# Patient Record
Sex: FEMALE | Race: WHITE | NOT HISPANIC OR LATINO | Employment: OTHER | URBAN - METROPOLITAN AREA
[De-identification: names, ages, dates, MRNs, and addresses within clinical notes are randomized per-mention and may not be internally consistent; named-entity substitution may affect disease eponyms.]

---

## 2017-05-03 DIAGNOSIS — R53.83 FATIGUE, UNSPECIFIED TYPE: ICD-10-CM

## 2017-05-03 DIAGNOSIS — E03.9 HYPOTHYROIDISM, UNSPECIFIED TYPE: ICD-10-CM

## 2017-05-03 RX ORDER — THYROID 60 MG
TABLET ORAL
Qty: 90 TABLET | Refills: 0 | Status: SHIPPED | OUTPATIENT
Start: 2017-05-03 | End: 2017-08-01

## 2017-08-01 DIAGNOSIS — R53.83 FATIGUE, UNSPECIFIED TYPE: ICD-10-CM

## 2017-08-01 DIAGNOSIS — E03.9 HYPOTHYROIDISM, UNSPECIFIED TYPE: ICD-10-CM

## 2017-08-02 RX ORDER — THYROID 60 MG
TABLET ORAL
Qty: 30 TABLET | Refills: 0 | Status: SHIPPED | OUTPATIENT
Start: 2017-08-02 | End: 2017-08-04

## 2017-08-02 NOTE — TELEPHONE ENCOUNTER
[place] [facility]     Last Written Prescription Date: 5/3/17  Last Quantity: 90, # refills: 0  Last Office Visit with [facility] or [facility] prescribing provider: 7/12/16        TSH   Date Value Ref Range Status   07/08/2016 0.61 0.40 - 4.00 mU/L Final

## 2017-08-03 DIAGNOSIS — R53.83 FATIGUE, UNSPECIFIED TYPE: ICD-10-CM

## 2017-08-03 DIAGNOSIS — E03.9 HYPOTHYROIDISM, UNSPECIFIED TYPE: ICD-10-CM

## 2017-08-03 RX ORDER — THYROID 60 MG
TABLET ORAL
Qty: 30 TABLET | Refills: 0 | OUTPATIENT
Start: 2017-08-03

## 2017-08-04 RX ORDER — THYROID 60 MG
TABLET ORAL
Qty: 30 TABLET | Refills: 0 | Status: SHIPPED | OUTPATIENT
Start: 2017-08-04 | End: 2017-09-01

## 2017-08-04 NOTE — TELEPHONE ENCOUNTER
Patient is requesting that script be sent to [facility] in [place] NOT [facility].  Script resent.

## 2017-09-01 ENCOUNTER — TELEPHONE (OUTPATIENT)
Dept: INTERNAL MEDICINE | Facility: CLINIC | Age: 57
End: 2017-09-01

## 2017-09-01 DIAGNOSIS — E03.9 HYPOTHYROIDISM, UNSPECIFIED TYPE: ICD-10-CM

## 2017-09-01 DIAGNOSIS — R53.83 FATIGUE, UNSPECIFIED TYPE: ICD-10-CM

## 2017-09-01 RX ORDER — THYROID 60 MG
TABLET ORAL
Qty: 30 TABLET | Refills: 1 | Status: SHIPPED | OUTPATIENT
Start: 2017-09-01 | End: 2017-10-13

## 2017-09-01 NOTE — TELEPHONE ENCOUNTER
TERESE THYROID 60 MG      Last Written Prescription Date: 08/04/17  Last Quantity: 30, # refills: 0  Last Office Visit with [facility] or [facility] prescribing provider: 07/12/16   Next 5 appointments (look out 90 days)     Nov 14, 2017 11:30 AM CST   [facility] with Madd[facility]jas MD   [facility]    [address] [address] [phone]                   TSH   Date Value Ref Range Status   07/08/2016 0.61 0.40 - 4.00 mU/L Final

## 2017-09-01 NOTE — TELEPHONE ENCOUNTER
Patient needs to have an appointment from the one that was rescheduled from a cancellation.  Please give her a call to set that up [phone]

## 2017-09-01 NOTE — TELEPHONE ENCOUNTER
Prescription approved per [facility] Refill Protocol.  [facility]

## 2017-10-13 ENCOUNTER — OFFICE VISIT (OUTPATIENT)
Dept: INTERNAL MEDICINE | Facility: CLINIC | Age: 57
End: 2017-10-13
Payer: COMMERCIAL

## 2017-10-13 VITALS
RESPIRATION RATE: 18 BRPM | TEMPERATURE: 98.4 F | BODY MASS INDEX: 27.5 KG/M2 | OXYGEN SATURATION: 99 % | WEIGHT: 186.2 LBS | HEART RATE: 72 BPM | SYSTOLIC BLOOD PRESSURE: 110 MMHG | DIASTOLIC BLOOD PRESSURE: 70 MMHG

## 2017-10-13 DIAGNOSIS — Z11.59 NEED FOR HEPATITIS C SCREENING TEST: ICD-10-CM

## 2017-10-13 DIAGNOSIS — R79.89 ELEVATED FERRITIN: ICD-10-CM

## 2017-10-13 DIAGNOSIS — R74.8 ELEVATED CK: ICD-10-CM

## 2017-10-13 DIAGNOSIS — R53.83 FATIGUE, UNSPECIFIED TYPE: ICD-10-CM

## 2017-10-13 DIAGNOSIS — E55.9 VITAMIN D DEFICIENCY: Primary | ICD-10-CM

## 2017-10-13 DIAGNOSIS — J20.9 ACUTE BRONCHITIS WITH SYMPTOMS > 10 DAYS: ICD-10-CM

## 2017-10-13 DIAGNOSIS — Z85.3 HX: BREAST CANCER: ICD-10-CM

## 2017-10-13 DIAGNOSIS — E78.5 HYPERLIPIDEMIA WITH TARGET LDL LESS THAN 160: ICD-10-CM

## 2017-10-13 DIAGNOSIS — E03.9 HYPOTHYROIDISM, UNSPECIFIED TYPE: ICD-10-CM

## 2017-10-13 LAB
ERYTHROCYTE [DISTWIDTH] IN BLOOD BY AUTOMATED COUNT: 13.3 % (ref 10–15)
HCT VFR BLD AUTO: 40.9 % (ref 35–47)
HGB BLD-MCNC: 13.3 G/DL (ref 11.7–15.7)
MCH RBC QN AUTO: 30.9 PG (ref 26.5–33)
MCHC RBC AUTO-ENTMCNC: 32.5 G/DL (ref 31.5–36.5)
MCV RBC AUTO: 95 FL (ref 78–100)
PLATELET # BLD AUTO: 284 10E9/L (ref 150–450)
RBC # BLD AUTO: 4.31 10E12/L (ref 3.8–5.2)
WBC # BLD AUTO: 10.4 10E9/L (ref 4–11)

## 2017-10-13 PROCEDURE — 82550 ASSAY OF CK (CPK): CPT | Performed by: INTERNAL MEDICINE

## 2017-10-13 PROCEDURE — 80053 COMPREHEN METABOLIC PANEL: CPT | Performed by: INTERNAL MEDICINE

## 2017-10-13 PROCEDURE — 36415 COLL VENOUS BLD VENIPUNCTURE: CPT | Performed by: INTERNAL MEDICINE

## 2017-10-13 PROCEDURE — 84439 ASSAY OF FREE THYROXINE: CPT | Performed by: INTERNAL MEDICINE

## 2017-10-13 PROCEDURE — 99214 OFFICE O/P EST MOD 30 MIN: CPT | Performed by: INTERNAL MEDICINE

## 2017-10-13 PROCEDURE — 86803 HEPATITIS C AB TEST: CPT | Performed by: INTERNAL MEDICINE

## 2017-10-13 PROCEDURE — 82306 VITAMIN D 25 HYDROXY: CPT | Performed by: INTERNAL MEDICINE

## 2017-10-13 PROCEDURE — 85027 COMPLETE CBC AUTOMATED: CPT | Performed by: INTERNAL MEDICINE

## 2017-10-13 PROCEDURE — 84443 ASSAY THYROID STIM HORMONE: CPT | Performed by: INTERNAL MEDICINE

## 2017-10-13 RX ORDER — THYROID 60 MG
TABLET ORAL
Qty: 90 TABLET | Refills: 3 | Status: SHIPPED | OUTPATIENT
Start: 2017-10-13 | End: 2023-08-17

## 2017-10-13 RX ORDER — AZITHROMYCIN 500 MG/1
500 TABLET, FILM COATED ORAL DAILY
Qty: 3 TABLET | Refills: 0 | Status: SHIPPED | OUTPATIENT
Start: 2017-10-13 | End: 2017-10-13

## 2017-10-13 RX ORDER — AZITHROMYCIN 500 MG/1
500 TABLET, FILM COATED ORAL DAILY
Qty: 3 TABLET | Refills: 0 | Status: SHIPPED | OUTPATIENT
Start: 2017-10-13 | End: 2017-10-27

## 2017-10-13 NOTE — PROGRESS NOTES
SUBJECTIVE:   Brianda Payton is a 57 year old female who presents to clinic today for the following health issues:      Medication Followup of     Taking Medication as prescribed: yes    Side Effects:  None    Medication Helping Symptoms:  yes       Hypothyroidism Follow-up    Since last visit, patient describes the following symptoms: Weight stable, no hair loss, no skin changes, no constipation, no loose stools      RESPIRATORY SYMPTOMS      Duration: 2 weeks    Description  nasal congestion, rhinorrhea, sore throat, facial pain/pressure and cough    Severity: moderate    Accompanying signs and symptoms: None    History (predisposing factors):  none    Precipitating or alleviating factors: None    Therapies tried and outcome:  rest and fluids oral decongestant      She is also here to discuss her medications from a naturopath provider but unfortunately she does not have a medication list with her today.  She reports that she has felt significantly better since she started on the vitamins and supplements as well as the bioidentical hormone medication   Problem list and histories reviewed & adjusted, as indicated.  Additional history: as documented    Labs reviewed in EPIC    Reviewed and updated as needed this visit by clinical staffTobacco  Allergies  Meds       Reviewed and updated as needed this visit by Provider         ROS:  14 point ROS negative except as above      OBJECTIVE:     /70  Pulse 72  Temp 98.4  F (36.9  C) (Oral)  Resp 18  Wt 186 lb 3.2 oz (84.5 kg)  LMP 09/27/1997  SpO2 99%  BMI 27.5 kg/m2  Body mass index is 27.5 kg/(m^2).  GENERAL: healthy, alert and no distress  NECK: no adenopathy, no asymmetry, masses, or scars and thyroid normal to palpation  RESP: lungs clear to auscultation - no rales, rhonchi or wheezes  CV: regular rate and rhythm, normal S1 S2, no S3 or S4, no murmur, click or rub, no peripheral edema and peripheral pulses strong  ABDOMEN: soft, nontender, no  hepatosplenomegaly, no masses and bowel sounds normal  MS: no gross musculoskeletal defects noted, no edema    Diagnostic Test Results:  Results for orders placed or performed in visit on 10/13/17   Vitamin D Deficiency   Result Value Ref Range    Vitamin D Deficiency screening 70 20 - 75 ug/L   Comprehensive metabolic panel   Result Value Ref Range    Sodium 141 133 - 144 mmol/L    Potassium 3.7 3.4 - 5.3 mmol/L    Chloride 105 94 - 109 mmol/L    Carbon Dioxide 29 20 - 32 mmol/L    Anion Gap 7 3 - 14 mmol/L    Glucose 85 70 - 99 mg/dL    Urea Nitrogen 10 7 - 30 mg/dL    Creatinine 0.72 0.52 - 1.04 mg/dL    GFR Estimate 83 >60 mL/min/1.7m2    GFR Estimate If Black >90 >60 mL/min/1.7m2    Calcium 9.0 8.5 - 10.1 mg/dL    Bilirubin Total 0.4 0.2 - 1.3 mg/dL    Albumin 4.0 3.4 - 5.0 g/dL    Protein Total 7.5 6.8 - 8.8 g/dL    Alkaline Phosphatase 75 40 - 150 U/L    ALT 29 0 - 50 U/L    AST 22 0 - 45 U/L   TSH   Result Value Ref Range    TSH 0.49 0.40 - 4.00 mU/L   T4 FREE   Result Value Ref Range    T4 Free 1.07 0.76 - 1.46 ng/dL   CBC with platelets   Result Value Ref Range    WBC 10.4 4.0 - 11.0 10e9/L    RBC Count 4.31 3.8 - 5.2 10e12/L    Hemoglobin 13.3 11.7 - 15.7 g/dL    Hematocrit 40.9 35.0 - 47.0 %    MCV 95 78 - 100 fl    MCH 30.9 26.5 - 33.0 pg    MCHC 32.5 31.5 - 36.5 g/dL    RDW 13.3 10.0 - 15.0 %    Platelet Count 284 150 - 450 10e9/L   CK total   Result Value Ref Range    CK Total 182 30 - 225 U/L   Hepatitis C Screen Reflex to HCV RNA Quant and Genotype   Result Value Ref Range    Hepatitis C Antibody Nonreactive NR^Nonreactive       ASSESSMENT/PLAN:     DIAGNOSIS/PLAN:     ICD-10-CM    1. Vitamin D deficiency E55.9 Vitamin D Deficiency     cholecalciferol (VITAMIN D3) 34793 UNITS capsule     DISCONTINUED: cholecalciferol (VITAMIN D3) 49465 UNITS capsule   2. Elevated CK R74.8 Comprehensive metabolic panel     CK total   3. Hypothyroidism, unspecified type E03.9 TSH     T4 FREE     CBC with platelets      ARMOUR THYROID 60 MG tablet   4. Fatigue, unspecified type R53.83 ARMOUR THYROID 60 MG tablet   5. Acute bronchitis with symptoms > 10 days J20.9 DISCONTINUED: azithromycin (ZITHROMAX) 500 MG tablet     DISCONTINUED: azithromycin (ZITHROMAX) 500 MG tablet   6. Need for hepatitis C screening test Z11.59 Hepatitis C Screen Reflex to HCV RNA Quant and Genotype   7. HX: breast cancer Z85.3 MR Breast Bilateral w Contrast   8. Hyperlipidemia with target LDL less than 160 E78.5 Lipid panel reflex to direct LDL   9. Elevated ferritin R79.89 Ferritin     Iron and iron binding capacity     Transferrin    FROM OUTSIDE       SIGNIFICANT ISSUES RE The primary encounter diagnosis was Vitamin D deficiency. Diagnoses of Elevated CK, Hypothyroidism, unspecified type, Fatigue, unspecified type, Acute bronchitis with symptoms > 10 days, Need for hepatitis C screening test, HX: breast cancer, Hyperlipidemia with target LDL less than 160, and Elevated ferritin were also pertinent to this visit. AS NOTED AND ADDRESSED ABOVE   MEDS AND AND LABS AS ORDERED TO ADDRESS PREVIOUS AND CURRENT ABNORMAL INDICES    SEE PATIENT INSTRUCTION SECTION FOR FOLLOW UP PLAN    Brianda IS TO CONTINUE OTHER TREATMENT REGIMEN/PLANS EXCEPT AS INDICATED    COMPLIANCE WITH MEDICATIONS DIET AND EXERCISE PLANS ENCOURAGED    DISCONTINUED MEDS:  Medications Discontinued During This Encounter   Medication Reason     cholecalciferol (VITAMIN D3) 36938 UNITS capsule Reorder     ARMOUR THYROID 60 MG tablet Reorder     azithromycin (ZITHROMAX) 500 MG tablet Reorder       CURRENT MED LIST WITH CHANGES AS NOTED BELOW:  Current Outpatient Prescriptions   Medication Sig Dispense Refill     cholecalciferol (VITAMIN D3) 28243 UNITS capsule TAKE ON THE 1ST, 10TH AND 20TH, WITH A FAT CONTAINING MEAL 40 capsule PRN     Calcium Lactate 500 MG CAPS THREE TIMES A DAY 30 capsule      ARMOUR THYROID 60 MG tablet TAKE 1 TABLET BY MOUTH DAILY FIRST THING IN THE MORNING ON AN EMPTY  STOMACH, NO FOOD FOR AN HOUR 90 tablet 3     aspirin 81 MG tablet Take 1 tablet (81 mg) by mouth daily INDICATION: FOR HEART AND CARDIOVASCULAR HEALTH 100 tablet 3     Cyanocobalamin (VITAMIN B 12 PO) Take 50 mcg by mouth             Patient Instructions     ** FOLLOW UP PLAN**:    PLEASE SCHEDULE E-VISIT 3-4 DAYS FROM TODAY TO FOLLOW UP ON YOUR TEST RESULTS     TO INITIATE AN e-VISIT PLEASE GO UNDER THE SECURE MESSAGING TAB IN [employer] AND CLICK ON REQUEST e-VISIT TAB      YOU HAVE ALSO BEEN REFERRED FOR MRI of the breasts  PLEASE  MAKE SURE TO SCHEDULE YOUR APPOINTMENT(S) BY TELEPHONE      YOU MAY CONTACT THE CLINIC IF ANY QUESTIONS OR CONCERNS -[phone] OR VIA [employer]      Component      Latest Ref Rng & Units 10/13/2017   Sodium      133 - 144 mmol/L 141   Potassium      3.4 - 5.3 mmol/L 3.7   Chloride      94 - 109 mmol/L 105   Carbon Dioxide      20 - 32 mmol/L 29   Anion Gap      3 - 14 mmol/L 7   Glucose      70 - 99 mg/dL 85   Urea Nitrogen      7 - 30 mg/dL 10   Creatinine      0.52 - 1.04 mg/dL 0.72   GFR Estimate      >60 mL/min/1.7m2 83   GFR Estimate If Black      >60 mL/min/1.7m2 >90   Calcium      8.5 - 10.1 mg/dL 9.0   Bilirubin Total      0.2 - 1.3 mg/dL 0.4   Albumin      3.4 - 5.0 g/dL 4.0   Protein Total      6.8 - 8.8 g/dL 7.5   Alkaline Phosphatase      40 - 150 U/L 75   ALT      0 - 50 U/L 29   AST      0 - 45 U/L 22   WBC      4.0 - 11.0 10e9/L 10.4   RBC Count      3.8 - 5.2 10e12/L 4.31   Hemoglobin      11.7 - 15.7 g/dL 13.3   Hematocrit      35.0 - 47.0 % 40.9   MCV      78 - 100 fl 95   MCH      26.5 - 33.0 pg 30.9   MCHC      31.5 - 36.5 g/dL 32.5   RDW      10.0 - 15.0 % 13.3   Platelet Count      150 - 450 10e9/L 284   Vitamin D Deficiency screening      20 - 75 ug/L 70   TSH      0.40 - 4.00 mU/L 0.49   T4 Free      0.76 - 1.46 ng/dL 1.07   CK Total      30 - 225 U/L 182   Hepatitis C Antibody      NR:Nonreactive Nonreactive           Maddison Rojas MD  [facility]  [facility]

## 2017-10-13 NOTE — MR AVS SNAPSHOT
After Visit Summary   10/13/2017    Brianda Payton    MRN: [id number]           Patient Information     Date Of Birth          1960        Visit Information        Provider Department      10/13/2017 2:00 PM Maddison Rojas MD [facility]        Today's Diagnoses     Vitamin D deficiency    -  1    Elevated CK        Hypothyroidism, unspecified type        Fatigue, unspecified type        Acute bronchitis with symptoms > 10 days        Need for hepatitis C screening test        HX: breast cancer        Hyperlipidemia with target LDL less than 160        Elevated ferritin          Care Instructions    ** FOLLOW UP PLAN**:    PLEASE SCHEDULE E-VISIT 3-4 DAYS FROM TODAY TO FOLLOW UP ON YOUR TEST RESULTS     TO INITIATE AN e-VISIT PLEASE GO UNDER THE SECURE MESSAGING TAB IN [employer] AND CLICK ON REQUEST e-VISIT TAB      YOU HAVE ALSO BEEN REFERRED FOR MRI of the breasts  PLEASE  MAKE SURE TO SCHEDULE YOUR APPOINTMENT(S) BY TELEPHONE      YOU MAY CONTACT THE CLINIC IF ANY QUESTIONS OR CONCERNS -[phone] OR VIA [employer]      Component      Latest Ref Rng & Units 10/13/2017   Sodium      133 - 144 mmol/L 141   Potassium      3.4 - 5.3 mmol/L 3.7   Chloride      94 - 109 mmol/L 105   Carbon Dioxide      20 - 32 mmol/L 29   Anion Gap      3 - 14 mmol/L 7   Glucose      70 - 99 mg/dL 85   Urea Nitrogen      7 - 30 mg/dL 10   Creatinine      0.52 - 1.04 mg/dL 0.72   GFR Estimate      >60 mL/min/1.7m2 83   GFR Estimate If Black      >60 mL/min/1.7m2 >90   Calcium      8.5 - 10.1 mg/dL 9.0   Bilirubin Total      0.2 - 1.3 mg/dL 0.4   Albumin      3.4 - 5.0 g/dL 4.0   Protein Total      6.8 - 8.8 g/dL 7.5   Alkaline Phosphatase      40 - 150 U/L 75   ALT      0 - 50 U/L 29   AST      0 - 45 U/L 22   WBC      4.0 - 11.0 10e9/L 10.4   RBC Count      3.8 - 5.2 10e12/L 4.31   Hemoglobin      11.7 - 15.7 g/dL 13.3   Hematocrit      35.0 - 47.0 % 40.9   MCV      78 - 100 fl 95   MCH     "  26.5 - 33.0 pg 30.9   MCHC      31.5 - 36.5 g/dL 32.5   RDW      10.0 - 15.0 % 13.3   Platelet Count      150 - 450 10e9/L 284   Vitamin D Deficiency screening      20 - 75 ug/L 70   TSH      0.40 - 4.00 mU/L 0.49   T4 Free      0.76 - 1.46 ng/dL 1.07   CK Total      30 - 225 U/L 182   Hepatitis C Antibody      NR:Nonreactive Nonreactive               Follow-ups after your visit        Your next 10 appointments already scheduled     Oct 27, 2017 10:30 AM CDT   (Arrive by 10:15 AM)   MA DIAGNOSTIC DIGITAL BILATERAL with [id number]   [facility] ([facility] [place])    [address]  [address] [facility] [address] [phone]           Do not use any powder, lotion or deodorant under your arms or on your breast. If you do, we will ask you to remove it before your exam.  Wear comfortable, two-piece clothing.  If you have any allergies, tell your care team.  Bring any previous mammograms from other facilities or have them mailed to the breast center.  Three-dimensional (3D) mammograms are available at [place] locations in [facility], [facility], and [place]. [facility] locations include [place] [facility] in [place]. Benefits of 3D mammograms include: - Improved rate of cancer detection - Decreases your chance of having to go back for more tests, which means fewer: - \"False-positive\" results (This means that there is an abnormal area but it isn't cancer.) - Invasive testing procedures, such as a biopsy or surgery - Can provide clearer images of the breast if you have dense breast tissue. 3D mammography is an optional exam that anyone can have with a 2D mammogram. It doesn't replace or take the place of a 2D mammogram. 2D mammograms remain an effective screening test for all women.  Not all insurance companies cover the cost of a 3D mammogram. Check with your insurance.            Oct 27, 2017 " 11:00 AM CDT   (Arrive by 10:45 AM)   Return Visit with Alysa Padron MD   [facility] ([facility])    [address]  [address] [facility] [address] [phone]              Who to contact     If you have questions or need follow up information about today's clinic visit or your schedule please contact [facility] directly at [phone].  Normal or non-critical lab and imaging results will be communicated to you by [email or web address], letter or phone within 4 business days after the clinic has received the results. If you do not hear from us within 7 days, please contact the clinic through [email or web address] or phone. If you have a critical or abnormal lab result, we will notify you by phone as soon as possible.  Submit refill requests through [employer] or call your pharmacy and they will forward the refill request to us. Please allow 3 business days for your refill to be completed.          Additional Information About Your Visit        [email or web address] Information     [employer] gives you secure access to your electronic health record. If you see a primary care provider, you can also send messages to your care team and make appointments. If you have questions, please call your primary care clinic.  If you do not have a primary care provider, please call [phone] and they will assist you.        Care EveryWhere ID     This is your Care EveryWhere ID. This could be used by other organizations to access your [place] medical records  [id number]        Your Vitals Were     Pulse Temperature Respirations Last Period Pulse Oximetry BMI (Body Mass Index)    72 98.4  F (36.9  C) (Oral) 18 09/27/1997 99% 27.5 kg/m2       Blood Pressure from Last 3 Encounters:   10/13/17 110/70   12/27/16 123/52   12/23/16 114/71    Weight from Last 3 Encounters:   10/13/17 186 lb 3.2 oz (84.5 kg)   12/27/16 186 lb (84.4 kg)   12/23/16 186 lb 12.8 oz (84.7 kg)              We  Performed the Following     CBC with platelets     CK total     Comprehensive metabolic panel     Hepatitis C Screen Reflex to HCV RNA Quant and Genotype     T4 FREE     TSH     Vitamin D Deficiency          Today's Medication Changes          These changes are accurate as of: 10/13/17 11:59 PM.  If you have any questions, ask your nurse or doctor.               Start taking these medicines.        Dose/Directions    azithromycin 500 MG tablet   Commonly known as:  ZITHROMAX   Used for:  Acute bronchitis with symptoms > 10 days   Started by:  Maddison Rojas MD        Dose:  500 mg   Take 1 tablet (500 mg) by mouth daily INDICATION: TO TREAT BRONCHITIS   Quantity:  3 tablet   Refills:  0       cholecalciferol 51832 UNITS capsule   Commonly known as:  VITAMIN D3   Used for:  Vitamin D deficiency   Started by:  Maddison Rojas MD        TAKE ON THE 1ST, 10TH AND 20TH, WITH A FAT CONTAINING MEAL   Quantity:  40 capsule   Refills:  PRN            Where to get your medicines      These medications were sent to [place] [id number]     Phone:  [phone]     azithromycin 500 MG tablet    cholecalciferol 88508 UNITS capsule         These medications were sent to [facility] [address] [place] [address] AT [place] [id number]    Hours:  24-hours Phone:  [phone]     ARMOUR THYROID 60 MG tablet                Primary Care Provider Office Phone # Fax #    Maddison Rojas [phone]       [address] [place] [address]        Equal Access to Services     MITCHELL GUZMAN : Hadii oscar martinezo Soelbert, waaxda luqadaha, qaybta kaalmada blakeyaeliezer, hebert jones. So [facility] [phone].    ATENCIÓN: Si habla español, tiene a wilde disposición servicios gratuitos de asistencia lingüística. Llame al [phone].    We comply with  applicable federal civil rights laws and [place] laws. We do not discriminate on the basis of race, color, national origin, age, disability, sex, sexual orientation, or gender identity.            Thank you!     Thank you for choosing [facility]  for your care. Our goal is always to provide you with excellent care. Hearing back from our patients is one way we can continue to improve our services. Please take a few minutes to complete the written survey that you may receive in the mail after your visit with us. Thank you!             Your Updated Medication List - Protect others around you: Learn how to safely use, store and throw away your medicines at www.disposemymeds.org.          This list is accurate as of: 10/13/17 11:59 PM.  Always use your most recent med list.                   Brand Name Dispense Instructions for use Diagnosis    ARMOUR THYROID 60 MG tablet   Generic drug:  thyroid     90 tablet    TAKE 1 TABLET BY MOUTH DAILY FIRST THING IN THE MORNING ON AN EMPTY STOMACH, NO FOOD FOR AN HOUR    Fatigue, unspecified type, Hypothyroidism, unspecified type       aspirin 81 MG tablet     100 tablet    Take 1 tablet (81 mg) by mouth daily INDICATION: FOR HEART AND CARDIOVASCULAR HEALTH    Hyperlipidemia with target LDL less than 160       azithromycin 500 MG tablet    ZITHROMAX    3 tablet    Take 1 tablet (500 mg) by mouth daily INDICATION: TO TREAT BRONCHITIS    Acute bronchitis with symptoms > 10 days       Calcium Lactate 500 MG Caps     30 capsule    THREE TIMES A DAY        cholecalciferol 52303 UNITS capsule    VITAMIN D3    40 capsule    TAKE ON THE 1ST, 10TH AND 20TH, WITH A FAT CONTAINING MEAL    Vitamin D deficiency

## 2017-10-13 NOTE — PATIENT INSTRUCTIONS
** FOLLOW UP PLAN**:    PLEASE SCHEDULE E-VISIT 3-4 DAYS FROM TODAY TO FOLLOW UP ON YOUR TEST RESULTS     TO INITIATE AN e-VISIT PLEASE GO UNDER THE SECURE MESSAGING TAB IN [employer] AND CLICK ON REQUEST e-VISIT TAB      YOU HAVE ALSO BEEN REFERRED FOR MRI of the breasts  PLEASE  MAKE SURE TO SCHEDULE YOUR APPOINTMENT(S) BY TELEPHONE      YOU MAY CONTACT THE CLINIC IF ANY QUESTIONS OR CONCERNS -[phone] OR VIA [employer]      Component      Latest Ref Rng & Units 10/13/2017   Sodium      133 - 144 mmol/L 141   Potassium      3.4 - 5.3 mmol/L 3.7   Chloride      94 - 109 mmol/L 105   Carbon Dioxide      20 - 32 mmol/L 29   Anion Gap      3 - 14 mmol/L 7   Glucose      70 - 99 mg/dL 85   Urea Nitrogen      7 - 30 mg/dL 10   Creatinine      0.52 - 1.04 mg/dL 0.72   GFR Estimate      >60 mL/min/1.7m2 83   GFR Estimate If Black      >60 mL/min/1.7m2 >90   Calcium      8.5 - 10.1 mg/dL 9.0   Bilirubin Total      0.2 - 1.3 mg/dL 0.4   Albumin      3.4 - 5.0 g/dL 4.0   Protein Total      6.8 - 8.8 g/dL 7.5   Alkaline Phosphatase      40 - 150 U/L 75   ALT      0 - 50 U/L 29   AST      0 - 45 U/L 22   WBC      4.0 - 11.0 10e9/L 10.4   RBC Count      3.8 - 5.2 10e12/L 4.31   Hemoglobin      11.7 - 15.7 g/dL 13.3   Hematocrit      35.0 - 47.0 % 40.9   MCV      78 - 100 fl 95   MCH      26.5 - 33.0 pg 30.9   MCHC      31.5 - 36.5 g/dL 32.5   RDW      10.0 - 15.0 % 13.3   Platelet Count      150 - 450 10e9/L 284   Vitamin D Deficiency screening      20 - 75 ug/L 70   TSH      0.40 - 4.00 mU/L 0.49   T4 Free      0.76 - 1.46 ng/dL 1.07   CK Total      30 - 225 U/L 182   Hepatitis C Antibody      NR:Nonreactive Nonreactive

## 2017-10-13 NOTE — NURSING NOTE
"  Chief Complaint   Patient presents with     Recheck Medication     Thyroid Problem       Initial /70  Pulse 72  Temp 98.4  F (36.9  C) (Oral)  Resp 18  Wt 186 lb 3.2 oz (84.5 kg)  LMP 09/27/1997  SpO2 99%  BMI 27.5 kg/m2 Estimated body mass index is 27.5 kg/(m^2) as calculated from the following:    Height as of 12/27/16: 5' 9\" (1.753 m).    Weight as of this encounter: 186 lb 3.2 oz (84.5 kg).  Blood pressure completed using cuff size: regular    "

## 2017-10-14 LAB
ALBUMIN SERPL-MCNC: 4 G/DL (ref 3.4–5)
ALP SERPL-CCNC: 75 U/L (ref 40–150)
ALT SERPL W P-5'-P-CCNC: 29 U/L (ref 0–50)
ANION GAP SERPL CALCULATED.3IONS-SCNC: 7 MMOL/L (ref 3–14)
AST SERPL W P-5'-P-CCNC: 22 U/L (ref 0–45)
BILIRUB SERPL-MCNC: 0.4 MG/DL (ref 0.2–1.3)
BUN SERPL-MCNC: 10 MG/DL (ref 7–30)
CALCIUM SERPL-MCNC: 9 MG/DL (ref 8.5–10.1)
CHLORIDE SERPL-SCNC: 105 MMOL/L (ref 94–109)
CK SERPL-CCNC: 182 U/L (ref 30–225)
CO2 SERPL-SCNC: 29 MMOL/L (ref 20–32)
CREAT SERPL-MCNC: 0.72 MG/DL (ref 0.52–1.04)
GFR SERPL CREATININE-BSD FRML MDRD: 83 ML/MIN/1.7M2
GLUCOSE SERPL-MCNC: 85 MG/DL (ref 70–99)
POTASSIUM SERPL-SCNC: 3.7 MMOL/L (ref 3.4–5.3)
PROT SERPL-MCNC: 7.5 G/DL (ref 6.8–8.8)
SODIUM SERPL-SCNC: 141 MMOL/L (ref 133–144)
T4 FREE SERPL-MCNC: 1.07 NG/DL (ref 0.76–1.46)
TSH SERPL DL<=0.005 MIU/L-ACNC: 0.49 MU/L (ref 0.4–4)

## 2017-10-15 LAB
DEPRECATED CALCIDIOL+CALCIFEROL SERPL-MC: 70 UG/L (ref 20–75)
HCV AB SERPL QL IA: NONREACTIVE

## 2017-10-17 NOTE — PROGRESS NOTES
Dear Brianda,   Your recent test results were reviewed and are within acceptable limits. Please continue with other treatment, and follow up plans as discussed and do not hesitate to contact me with any questions or concerns.  Stay healthy!    Regards,  Dr. Bob Baez

## 2017-10-27 ENCOUNTER — ONCOLOGY VISIT (OUTPATIENT)
Dept: ONCOLOGY | Facility: CLINIC | Age: 57
End: 2017-10-27
Attending: INTERNAL MEDICINE
Payer: COMMERCIAL

## 2017-10-27 VITALS
WEIGHT: 184.4 LBS | HEART RATE: 75 BPM | DIASTOLIC BLOOD PRESSURE: 73 MMHG | SYSTOLIC BLOOD PRESSURE: 129 MMHG | HEIGHT: 69 IN | BODY MASS INDEX: 27.31 KG/M2 | TEMPERATURE: 98 F | OXYGEN SATURATION: 96 %

## 2017-10-27 DIAGNOSIS — N60.99 ATYPICAL DUCTAL HYPERPLASIA OF BREAST: Primary | ICD-10-CM

## 2017-10-27 DIAGNOSIS — Z84.89 FAMILY HISTORY OF BRAIN TUMOR: ICD-10-CM

## 2017-10-27 PROCEDURE — 99213 OFFICE O/P EST LOW 20 MIN: CPT | Mod: ZP | Performed by: INTERNAL MEDICINE

## 2017-10-27 PROCEDURE — 99212 OFFICE O/P EST SF 10 MIN: CPT | Mod: ZF

## 2017-10-27 ASSESSMENT — PAIN SCALES - GENERAL: PAINLEVEL: NO PAIN (0)

## 2017-10-27 NOTE — LETTER
10/27/2017        RE: Brianda Payton  [address]  [facility] [address]     Dear Colleague,    Thank you for referring your patient, Brianda Payton, to the [facility]. Please see a copy of my visit note below.    October 27, 2017    REASON FOR VISIT:  I am seeing Ms. Brianda Payton in followup for atypical ductal hyperplasia of her breast.       HISTORY OF PRESENT ILLNESS:  Brianda is a 56-year-old female, otherwise healthy, who comes in today for follow up.     Brianda reports that she has been healthy and well.  She has a strong family history of melanoma as well as brain tumors as will be outlined below.  She reports that in 2005 she underwent bilateral breast reduction.  In 2006 she was found to have an abnormal mammogram.  Biopsy revealed atypical ductal hyperplasia.  I do not have copies of all of these reports.  She was originally told to undergo bilateral mastectomies.  She declined this and sought out further consultation.  She eventually met Dr. Latosha Leger.  She was recommended to undergo high-risk screening with breast MRI and mammogram.  She alternates these every 6 months.  She has had two additional negative breast biopsies at [facility] in the last couple of years.  These were both benign.       It was discussed with her whether to consider tamoxifen therapy as a preventative.  She is not interested in this.       Brianda returns today for followup.  Overall, she says that she is feeling well.  She has no issues with fevers or chills, chest pain or shortness of breath.  She has no nausea, vomiting, diarrhea or constipation.  She last had a menstrual period in 1997.  She has had no new breast nodules or masses.  She did have a screening mammogram today.      She has been seeing a functional medicine physician.  She has been trying to lose weight.  She is frustrated that she has not been able to lose weight and has questions about this.       Her sister was just recently  diagnosed with a glioblastoma and is being treated at [facility].  She has a very strong family history of breast cancer as well as brain tumors.  She has never seen a genetic counselor.  She is unsure whether anybody in her family has.           REVIEW OF SYSTEMS:  Her 10-point review of systems is otherwise negative.      PAST MEDICAL HISTORY:   1.  Atypical ductal hyperplasia of the breast.   2.  Hot flashes.   3.  Benign thyroid nodules that have been aspirated.   4.  Hypothyroidism.   5.  Obstructive sleep apnea.   6.  Allergic rhinitis.      MEDICATIONS:  Reviewed in the EMR.       ALLERGIES:  She has seasonal allergies.  No drug allergies.      SOCIAL HISTORY:  She had two grandbabies who are ages 1 and 3.  She and her  spend most of [place] in [place]  [place] to [place] times a [place].  [place] consider this their home base.  She has no tobacco use and rare alcohol use.       FAMILY HISTORY:  Her family history is significant for a paternal grandmother with breast cancer in her 70s, and a paternal aunt and a paternal cousin with brain tumors.  There is a lot of melanoma.  She has a sister with melanoma as well as a sister with a brain tumor.  She has two daughters, ages 31 and 35, who are both healthy without any evidence of cancer.       PAST GYNECOLOGIC HISTORY:  She is postmenopausal.  Her last menstrual period was in 1997.  She has had breast biopsies as outlined above.  She has two children, ages 31 and 35.  She was on hormone replacement therapy for short periods of time in the past.  Her one daughter has had rheumatic fever and heart valve surgery.  Of note, she has had a hysterectomy in her 30s due to fibroids.       PHYSICAL EXAMINATION: LMP 09/27/1997    GENERAL:  She is well-appearing, in no apparent distress.   HEENT:  Exam reveals no icterus.  Her oropharynx is clear.  There are no ulcers or lesions.    NECK:  Supple.     LYMPH:  There is no cervical, supraclavicular  "or axillary adenopathy.   HEART:  Regular.   LUNGS:  Clear bilaterally.   ABDOMEN:  Soft, nontender, nondistended.  There is no palpable hepatosplenomegaly.   BREASTS:  She is status post bilateral breast reduction.  She has a scar at the 9 o'clock position involving the upper outer quadrant of her right breast.  There are no nodules or masses in either breast.  There is no axillary adenopathy, no nipple discharge, and no changes in the skin.   NEUROLOGIC:  Exam is unremarkable.    SKIN:  There are no skin rashes.       LABORATORY:  No laboratories.       ASSESSMENT AND PLAN:  This is a 57-year-old female with a history of atypical ductal hyperplasia involving the right breast, status post lumpectomy, currently on observation for high-risk disease involving breast MRI and mammogram.       1.  Atypical ductal hyperplasia.  She has no evidence of breast cancer today.  Her last mammogram was today. We discussed that she would be due for a breast MRI in April.  She is alternating these every 6 months.       We spent time discussing chemo prevention using tamoxifen or an aromatase inhibitor.  We discussed the pros and cons of this.  She is not interested.       We discussed weight management.  She has been seeing a functional medicine physician and was told her estrogen levels are \"high.\"  We discussed exercise of at least 150 minutes per week as well as a diet high in fruits and vegetables for evaluation and continued overall health goals.       With her strong family history of melanoma and brain tumors, I recommended a referral to Genetics.  This referral was placed.        2.  She has a benign thyroid.       3.  She sees her primary care physician for other health care prevention.      I will see her back annually with her mammogram.  She is due for a breast MRI in 6 months and this order was placed in the chart.         DAYDAY DEMPSEY MD              "

## 2017-10-27 NOTE — PROGRESS NOTES
October 27, 2017    REASON FOR VISIT:  I am seeing Ms. Brianda Payton in followup for atypical ductal hyperplasia of her breast.       HISTORY OF PRESENT ILLNESS:  Brianda is a 56-year-old female, otherwise healthy, who comes in today for follow up.     Brianda reports that she has been healthy and well.  She has a strong family history of melanoma as well as brain tumors as will be outlined below.  She reports that in 2005 she underwent bilateral breast reduction.  In 2006 she was found to have an abnormal mammogram.  Biopsy revealed atypical ductal hyperplasia.  I do not have copies of all of these reports.  She was originally told to undergo bilateral mastectomies.  She declined this and sought out further consultation.  She eventually met Dr. Latosha Leger.  She was recommended to undergo high-risk screening with breast MRI and mammogram.  She alternates these every 6 months.  She has had two additional negative breast biopsies at [facility] in the last couple of years.  These were both benign.       It was discussed with her whether to consider tamoxifen therapy as a preventative.  She is not interested in this.       Brianda returns today for followup.  Overall, she says that she is feeling well.  She has no issues with fevers or chills, chest pain or shortness of breath.  She has no nausea, vomiting, diarrhea or constipation.  She last had a menstrual period in 1997.  She has had no new breast nodules or masses.  She did have a screening mammogram today.      She has been seeing a functional medicine physician.  She has been trying to lose weight.  She is frustrated that she has not been able to lose weight and has questions about this.       Her sister was just recently diagnosed with a glioblastoma and is being treated at [facility].  She has a very strong family history of breast cancer as well as brain tumors.  She has never seen a genetic counselor.  She is unsure whether anybody in her family has.            REVIEW OF SYSTEMS:  Her 10-point review of systems is otherwise negative.      PAST MEDICAL HISTORY:   1.  Atypical ductal hyperplasia of the breast.   2.  Hot flashes.   3.  Benign thyroid nodules that have been aspirated.   4.  Hypothyroidism.   5.  Obstructive sleep apnea.   6.  Allergic rhinitis.      MEDICATIONS:  Reviewed in the EMR.       ALLERGIES:  She has seasonal allergies.  No drug allergies.      SOCIAL HISTORY:  She had two grandbabies who are ages 1 and 3.  She and her  spend most of [place]  [place] times [place].  [place] their home [place].  She has no tobacco use and rare alcohol use.       FAMILY HISTORY:  Her family history is significant for a paternal grandmother with breast cancer in her 70s, and a paternal aunt and a paternal cousin with brain tumors.  There is a lot of melanoma.  She has a sister with melanoma as well as a sister with a brain tumor.  She has two daughters, ages 31 and 35, who are both healthy without any evidence of cancer.       PAST GYNECOLOGIC HISTORY:  She is postmenopausal.  Her last menstrual period was in 1997.  She has had breast biopsies as outlined above.  She has two children, ages 31 and 35.  She was on hormone replacement therapy for short periods of time in the past.  Her one daughter has had rheumatic fever and heart valve surgery.  Of note, she has had a hysterectomy in her 30s due to fibroids.       PHYSICAL EXAMINATION: LMP 09/27/1997    GENERAL:  She is well-appearing, in no apparent distress.   HEENT:  Exam reveals no icterus.  Her oropharynx is clear.  There are no ulcers or lesions.    NECK:  Supple.     LYMPH:  There is no cervical, supraclavicular or axillary adenopathy.   HEART:  Regular.   LUNGS:  Clear bilaterally.   ABDOMEN:  Soft, nontender, nondistended.  There is no palpable hepatosplenomegaly.   BREASTS:  She is status post bilateral breast reduction.  She has a scar at the 9  "o'clock position involving the upper outer quadrant of her right breast.  There are no nodules or masses in either breast.  There is no axillary adenopathy, no nipple discharge, and no changes in the skin.   NEUROLOGIC:  Exam is unremarkable.    SKIN:  There are no skin rashes.       LABORATORY:  No laboratories.       ASSESSMENT AND PLAN:  This is a 57-year-old female with a history of atypical ductal hyperplasia involving the right breast, status post lumpectomy, currently on observation for high-risk disease involving breast MRI and mammogram.       1.  Atypical ductal hyperplasia.  She has no evidence of breast cancer today.  Her last mammogram was today. We discussed that she would be due for a breast MRI in April.  She is alternating these every 6 months.       We spent time discussing chemo prevention using tamoxifen or an aromatase inhibitor.  We discussed the pros and cons of this.  She is not interested.       We discussed weight management.  She has been seeing a functional medicine physician and was told her estrogen levels are \"high.\"  We discussed exercise of at least 150 minutes per week as well as a diet high in fruits and vegetables for evaluation and continued overall health goals.       With her strong family history of melanoma and brain tumors, I recommended a referral to Genetics.  This referral was placed.        2.  She has a benign thyroid.       3.  She sees her primary care physician for other health care prevention.      I will see her back annually with her mammogram.  She is due for a breast MRI in 6 months and this order was placed in the chart.         DAYDAY DEMPSEY MD        "

## 2017-10-27 NOTE — MR AVS SNAPSHOT
After Visit Summary   10/27/2017    Brianda Payton    MRN: [id number]           Patient Information     Date Of Birth          1960        Visit Information        Provider Department      10/27/2017 11:00 AM Alysa Padron MD [facility]        Today's Diagnoses     Atypical ductal hyperplasia of breast    -  1    Family history of brain tumor           Follow-ups after your visit        Additional Services     CANCER RISK MGMT/CANCER GENETIC COUNSELING REFERRAL       Your provider has referred you to the Cancer Risk Management Program - Cancer Genetic Counseling.    Reason for Referral: multiple family members with brain tumors, GBM, personal history of ADH    We have a sent a notice to a staff member of the Cancer Risk Management Program to give you a call to assist with scheduling your appointment.  You may also call  [phone] ([phone]) to initiate scheduling.    Please be aware that coverage of these services is subject to the terms and limitations of your health insurance plan.  Call member services at your health plan with any benefit or coverage questions.      Please bring the completed family history sheet to your appointment in addition to any available outside medical records documenting your cancer diagnosis.                  Your next 10 appointments already scheduled     Feb 14, 2018 10:45 AM CST   (Arrive by 10:30 AM)   MR BREAST BILATERAL W/O & W CONTRAST with [address] [facility] ([facility])    [address] [place] [address] [phone]           Take your medicines as usual, unless your doctor tells you not to. Bring a list of your current medicines to your exam (including vitamins, minerals and over-the-counter drugs).  The timing of your exam may depend on the start of your last period. If you re in menopause, you may have the exam anytime.  Please bring any  previous mammograms or breast MRIs from other facilities to the MRI dept. Do not mail these items to us.  You will have contrast for this exam. To prepare:   The day before your exam, drink extra fluids at least six 8-ounce glasses (unless your doctor tells you to restrict your fluids).   Have a blood test (creatinine test) within 30 days of your exam. Go to your clinic or Diagnostic Imaging Department for this test.  The MRI machine uses a strong magnet. Please wear clothes without metal (snaps, zippers). A sweatsuit works well, or we may give you a hospital gown.  Please remove any body piercings and hair extensions before you arrive. You will also remove watches, jewelry, hairpins, wallets, dentures, partial dental plates and hearing aids. You may wear contact lenses, and you may be able to wear your rings. We have a safe place to keep your personal items, but it is safer to leave them at home.   **IMPORTANT** THE INSTRUCTIONS BELOW ARE ONLY FOR THOSE PATIENTS WHO HAVE BEEN TOLD THEY WILL RECEIVE SEDATION OR GENERAL ANESTHESIA DURING THEIR MRI PROCEDURE:  IF YOU WILL RECEIVE SEDATION (take medicine to help you relax during your exam)   You must get the medicine from your doctor before you arrive. Bring the medicine to the exam. Do not take it at home.   Arrive one hour early. Bring someone who can take you home after the test. Your medicine will make you sleepy. After the exam, you may not drive, take a bus or take a taxi by yourself.   No eating 8 hours before your exam. You may have clear liquids up until 4 hours before your exam. (Clear liquids include water, clear tea, black coffee and fruit juice without pulp.)  IF YOU WILL RECEIVE ANESTHESIA (be asleep for your exam)   Arrive 1 1/2 hours early. Bring someone who can take you home after the test. You may not drive, take a bus or take a taxi by yourself.   No eating 8 hours before your exam. You may have clear liquids up until 4 hours before your exam. (Clear  "liquids include water, clear tea, black coffee and fruit juice without pulp.)  If you have any questions, please contact your Imaging Department exam site.            Feb 14, 2018 12:00 PM CST   (Arrive by 11:45 AM)   KALLIE WITH ROOM with Ria Barry GC,  2 114 CONSULT [facility] ([facility])    [address] [place] [address]  2nd Floor  [facility] [address] [phone]            Jul 17, 2018 10:30 AM CDT   (Arrive by 10:15 AM)   MA DIAGNOSTIC BILATERAL W/ YA with [id number]   [facility] [place] [facility] ([facility])    [address] [facility]  2nd [facility] [address] [phone]           Do not use any powder, lotion or deodorant under your arms or on your breast. If you do, we will ask you to remove it before your exam.  Wear comfortable, two-piece clothing.  If you have any allergies, tell your care team.  Bring any previous mammograms from other facilities or have them mailed to the breast center.  Three-dimensional (3D) mammograms are available at [place] locations in [facility], [facility], [place], [place], and [place]. [facility] locations include [place] [facility] in [place]. Benefits of 3D mammograms include: - Improved rate of cancer detection - Decreases your chance of having to go back for more tests, which means fewer: - \"False-positive\" results (This means that there is an abnormal area but it isn't cancer.) - Invasive testing procedures, such as a biopsy or surgery - Can provide clearer images of the breast if you have dense breast tissue. 3D mammography is an optional exam that anyone can have with a 2D mammogram. It doesn't replace or take the place of a 2D mammogram. 2D mammograms remain an effective screening test for all women.  Not all insurance companies cover the cost of a 3D mammogram. Check with your insurance.              Future tests that " were ordered for you today     Open Standing Orders        Priority Remaining Interval Expires Ordered    MA Diagnostic Digital Bilateral Routine 6/6  10/28/2018 10/27/2017    MR Breast Bilateral w/o & w Contrast Repeat 3/3  10/28/2018 10/27/2017    MA Screening Digital Bilateral Routine 4/6  12/24/2017 12/23/2016          Open Future Orders        Priority Expected Expires Ordered    MR Breast Bilateral w/o & w Contrast Routine  10/27/2018 10/27/2017    MR Breast Bilateral w/o & w Contrast Routine  10/27/2018 10/27/2017    MR Breast Bilateral w/o & w Contrast Routine  10/27/2018 10/27/2017    MA Diagnostic Bilateral w/Geoffrey Routine  10/27/2018 10/27/2017            Who to contact     If you have questions or need follow up information about today's clinic visit or your schedule please contact [facility] directly at [phone].  Normal or non-critical lab and imaging results will be communicated to you by MyChart, letter or phone within 4 business days after the clinic has received the results. If you do not hear from us within 7 days, please contact the clinic through [employer] or phone. If you have a critical or abnormal lab result, we will notify you by phone as soon as possible.  Submit refill requests through [employer] or call your pharmacy and they will forward the refill request to us. Please allow 3 business days for your refill to be completed.          Additional Information About Your Visit        [employer] NEXT Information     [employer] gives you secure access to your electronic health record. If you see a primary care provider, you can also send messages to your care team and make appointments. If you have questions, please call your primary care clinic.  If you do not have a primary care provider, please call [phone] and they will assist you.        Care EveryWhere ID     This is your Care EveryWhere ID. This could be used by other organizations to access your [facility]  "records  [id number]        Your Vitals Were     Pulse Temperature Height Last Period Pulse Oximetry BMI (Body Mass Index)    75 98  F (36.7  C) (Oral) 1.753 m (5' 9\") 09/27/1997 96% 27.23 kg/m2       Blood Pressure from Last 3 Encounters:   10/27/17 129/73   10/13/17 110/70   12/27/16 123/52    Weight from Last 3 Encounters:   10/27/17 83.6 kg (184 lb 6.4 oz)   10/13/17 84.5 kg (186 lb 3.2 oz)   12/27/16 84.4 kg (186 lb)              We Performed the Following     CANCER RISK MGMT/CANCER GENETIC COUNSELING REFERRAL          Today's Medication Changes          These changes are accurate as of: 10/27/17 12:04 PM.  If you have any questions, ask your nurse or doctor.               Stop taking these medicines if you haven't already. Please contact your care team if you have questions.     azithromycin 500 MG tablet   Commonly known as:  ZITHROMAX   Stopped by:  Alysa Padron MD                    Primary Care Provider Office Phone # Fax #    Maddison Rojas [phone]       [address] [place] [address]        Equal Access to Services     MITCHELL GUZMAN AH: Hadtyshawn martinezo Soelbert, waaxda luqadaha, qaybta kaalmada adeegyada, hebert jones. So [facility] [phone].    ATENCIÓN: Si habla español, tiene a wilde disposición servicios gratuitos de asistencia lingüística. Llame al [phone].    We comply with applicable federal civil rights laws and [place] laws. We do not discriminate on the basis of race, color, national origin, age, disability, sex, sexual orientation, or gender identity.            Thank you!     Thank you for choosing [facility]  for your care. Our goal is always to provide you with excellent care. Hearing back from our patients is one way we can continue to improve our services. Please take a few minutes to complete the written survey that you may receive in the mail after your visit with us. Thank you!             Your " Updated Medication List - Protect others around you: Learn how to safely use, store and throw away your medicines at www.disposemymeds.org.          This list is accurate as of: 10/27/17 12:04 PM.  Always use your most recent med list.                   Brand Name Dispense Instructions for use Diagnosis    TERESE THYROID 60 MG tablet   Generic drug:  thyroid     90 tablet    TAKE 1 TABLET BY MOUTH DAILY FIRST THING IN THE MORNING ON AN EMPTY STOMACH, NO FOOD FOR AN HOUR    Fatigue, unspecified type, Hypothyroidism, unspecified type       aspirin 81 MG tablet     100 tablet    Take 1 tablet (81 mg) by mouth daily INDICATION: FOR HEART AND CARDIOVASCULAR HEALTH    Hyperlipidemia with target LDL less than 160       Calcium Lactate 500 MG Caps     30 capsule    THREE TIMES A DAY        cholecalciferol 90213 UNITS capsule    VITAMIN D3    40 capsule    TAKE ON THE 1ST, 10TH AND 20TH, WITH A FAT CONTAINING MEAL    Vitamin D deficiency       VITAMIN B 12 PO      Take 50 mcg by mouth    Atypical ductal hyperplasia of breast, Family history of brain tumor

## 2017-10-27 NOTE — NURSING NOTE
"Oncology Rooming Note    October 27, 2017 11:29 AM   Brianda Payton is a 57 year old female who presents for:    Chief Complaint   Patient presents with     Oncology Clinic Visit     Follow up-Breast CA     Initial Vitals: /73 (BP Location: Right arm, Patient Position: Sitting, Cuff Size: Adult Regular)  Pulse 75  Temp 98  F (36.7  C) (Oral)  Ht 1.753 m (5' 9\")  Wt 83.6 kg (184 lb 6.4 oz)  LMP 09/27/1997  SpO2 96%  BMI 27.23 kg/m2 Estimated body mass index is 27.23 kg/(m^2) as calculated from the following:    Height as of this encounter: 1.753 m (5' 9\").    Weight as of this encounter: 83.6 kg (184 lb 6.4 oz). Body surface area is 2.02 meters squared.  No Pain (0) Comment: Data Unavailable   Patient's last menstrual period was 09/27/1997.  Allergies reviewed: Yes  Medications reviewed: Yes    Medications: Medication refills not needed today.  Pharmacy name entered into [facility]:    [id number] - [place], [place] - [address] [place] [address] AT [facility]  [id number] - [place], [place] - [address] [facility] AT [facility] OF [address]     Clinical concerns: Questions Dr. Padron was notified.    10 minutes for nursing intake (face to face time)     Sheila Grimaldo LPN              "

## 2017-10-29 DIAGNOSIS — R53.83 FATIGUE, UNSPECIFIED TYPE: ICD-10-CM

## 2017-10-29 DIAGNOSIS — E03.9 HYPOTHYROIDISM, UNSPECIFIED TYPE: ICD-10-CM

## 2017-10-31 RX ORDER — THYROID 60 MG
TABLET ORAL
Qty: 30 TABLET | Refills: 0 | OUTPATIENT
Start: 2017-10-31

## 2018-02-14 ENCOUNTER — RADIANT APPOINTMENT (OUTPATIENT)
Dept: MRI IMAGING | Facility: CLINIC | Age: 58
End: 2018-02-14
Attending: INTERNAL MEDICINE
Payer: COMMERCIAL

## 2018-02-14 DIAGNOSIS — Z84.89 FAMILY HISTORY OF BRAIN TUMOR: ICD-10-CM

## 2018-02-14 DIAGNOSIS — N60.99 ATYPICAL DUCTAL HYPERPLASIA OF BREAST: ICD-10-CM

## 2018-02-14 RX ORDER — GADOBUTROL 604.72 MG/ML
7.5 INJECTION INTRAVENOUS ONCE
Status: COMPLETED | OUTPATIENT
Start: 2018-02-14 | End: 2018-02-14

## 2018-02-14 RX ADMIN — GADOBUTROL 7.5 ML: 604.72 INJECTION INTRAVENOUS at 11:20

## 2018-02-14 NOTE — DISCHARGE INSTRUCTIONS
MRI Contrast Discharge Instructions    The IV contrast you received today will pass out of your body in your  urine. This will happen in the next 24 hours. You will not feel this process.  Your urine will not change color.    Drink at least 4 extra glasses of water or juice today (unless your doctor  has restricted your fluids). This reduces the stress on your kidneys.  You may take your regular medicines.    If you are on dialysis: It is best to have dialysis today.    If you have a reaction: Most reactions happen right away. If you have  any new symptoms after leaving the hospital (such as hives or swelling),  call your hospital at the correct number below. Or call your family doctor.  If you have breathing distress or wheezing, call 911.    Special instructions: ***    I have read and understand the above information.    Signature:______________________________________ Date:___________    Staff:__________________________________________ Date:___________     Time:__________    [facility] Departments:    ___[place]: [phone]  ___[facility]: [phone]  ___[place]: [phone] ___[facility]: [phone]  ___[facility]: [phone]  ___[facility]: [phone]  ___[phone]  ___[facility]: [phone]  ___[phone]

## 2018-08-24 ENCOUNTER — TRANSFERRED RECORDS (OUTPATIENT)
Dept: HEALTH INFORMATION MANAGEMENT | Facility: CLINIC | Age: 58
End: 2018-08-24

## 2018-08-28 ENCOUNTER — OFFICE VISIT (OUTPATIENT)
Dept: NEUROSURGERY | Facility: CLINIC | Age: 58
End: 2018-08-28
Attending: NEUROLOGICAL SURGERY
Payer: COMMERCIAL

## 2018-08-28 VITALS
DIASTOLIC BLOOD PRESSURE: 74 MMHG | SYSTOLIC BLOOD PRESSURE: 115 MMHG | HEART RATE: 68 BPM | TEMPERATURE: 98.2 F | OXYGEN SATURATION: 97 %

## 2018-08-28 DIAGNOSIS — M54.2 CHRONIC NECK PAIN: ICD-10-CM

## 2018-08-28 DIAGNOSIS — G89.29 CHRONIC NECK PAIN: ICD-10-CM

## 2018-08-28 DIAGNOSIS — M54.12 CERVICAL RADICULOPATHY: Primary | ICD-10-CM

## 2018-08-28 PROCEDURE — 99203 OFFICE O/P NEW LOW 30 MIN: CPT | Performed by: NEUROLOGICAL SURGERY

## 2018-08-28 PROCEDURE — G0463 HOSPITAL OUTPT CLINIC VISIT: HCPCS

## 2018-08-28 ASSESSMENT — PAIN SCALES - GENERAL: PAINLEVEL: MODERATE PAIN (4)

## 2018-08-28 NOTE — MR AVS SNAPSHOT
After Visit Summary   8/28/2018    Brianda Payton    MRN: [id number]           Patient Information     Date Of Birth          1960        Visit Information        Provider Department      8/28/2018 8:30 AM Som Price MD [facility]        Today's Diagnoses     Cervical radiculopathy    -  1    Chronic neck pain          Care Instructions    1. Referral to Dr. Tapia for EMG of right arm. Scheduled for Thursday 8/30/18 at 10:00am. We will contact you with the results.   Check in a few minutes prior to appt.   Bring photo ID and insurance card   No lotions to right hand or arm day of appt.   2. Referral for physical therapy.   3. Please call our clinic with any questions or concerns: [phone]            Follow-ups after your visit        Additional Services     [facility] PT, HAND, AND CHIROPRACTIC REFERRAL       **This order will print in the [facility] Scheduling Office**    Physical Therapy, Hand Therapy and Chiropractic Care are available through:    *[facility]  *[facility]  *[facility]    Call one number to schedule at any of the above locations: [phone].    Your provider has referred you to: Physical Therapy at [facility] or [facility]    Indication/Reason for Referral: Neck Pain  Onset of Illness:   Therapy Orders: Evaluate and Treat  Special Programs: None  Special Request: None    Eleni Knowles      Additional Comments for the Therapist or Chiropractor:     Please be aware that coverage of these services is subject to the terms and limitations of your health insurance plan.  Call member services at your health plan with any benefit or coverage questions.      Please bring the following to your appointment:    *Your personal calendar for scheduling future appointments  *Comfortable clothing                  Your next 10 appointments already scheduled     Oct 29, 2018  9:50 AM CDT   MA DIAGNOSTIC BILATERAL W/  "YA with [id number]   [facility]    [address]  [address] Floor  [place] [address] [phone]           Do not use any powder, lotion or deodorant under your arms or on your breast. If you do, we will ask you to remove it before your exam.  Wear comfortable, two-piece clothing.  If you have any allergies, tell your care team.  Bring any previous mammograms from other facilities or have them mailed to the breast center.  Three-dimensional (3D) mammograms are available at [place] locations in [facility], [facility], and [place]. [facility] locations include [place] and [facility] in [place]. Benefits of 3D mammograms include: - Improved rate of cancer detection - Decreases your chance of having to go back for more tests, which means fewer: - \"False-positive\" results (This means that there is an abnormal area but it isn't cancer.) - Invasive testing procedures, such as a biopsy or surgery - Can provide clearer images of the breast if you have dense breast tissue. 3D mammography is an optional exam that anyone can have with a 2D mammogram. It doesn't replace or take the place of a 2D mammogram. 2D mammograms remain an effective screening test for all women.  Not all insurance companies cover the cost of a 3D mammogram. Check with your insurance.            Oct 29, 2018 10:30 AM CDT   (Arrive by 10:15 AM)   Return Visit with Alysa Padron MD   [facility]    [address]  [address]  [facility] [place] [id number] [phone]              Who to contact     If you have questions or need follow up information about today's clinic visit or your schedule please contact [facility] directly at [phone].  Normal or non-critical lab and imaging results will be communicated to you by " MyChart, letter or phone within 4 business days after the clinic has received the results. If you do not hear from us within 7 days, please contact the clinic through [employer] or phone. If you have a critical or abnormal lab result, we will notify you by phone as soon as possible.  Submit refill requests through [employer] or call your pharmacy and they will forward the refill request to us. Please allow 3 business days for your refill to be completed.          Additional Information About Your Visit        [employer] Information     [employer] gives you secure access to your electronic health record. If you see a primary care provider, you can also send messages to your care team and make appointments. If you have questions, please call your primary care clinic.  If you do not have a primary care provider, please call [phone] and they will assist you.        Care EveryWhere ID     This is your Care EveryWhere ID. This could be used by other organizations to access your [place] medical records  [id number]        Your Vitals Were     Pulse Temperature Last Period Pulse Oximetry Breastfeeding?       68 98.2  F (36.8  C) (Oral) 09/27/1997 97% No        Blood Pressure from Last 3 Encounters:   08/28/18 115/74   10/27/17 129/73   10/13/17 110/70    Weight from Last 3 Encounters:   10/27/17 184 lb 6.4 oz (83.6 kg)   10/13/17 186 lb 3.2 oz (84.5 kg)   12/27/16 186 lb (84.4 kg)              We Performed the Following     [facility] PT, HAND, AND CHIROPRACTIC REFERRAL     NEEDLE EMG SINGLE FIBER        Primary Care Provider Office Phone # Fax #    Maddison Rojas [phone]       [facility] [address]  [place] [address]        Equal Access to Services     DOMINIC [facility] Hospital[facility] : Hadii oscar Kendrick, waisaacda luqadaha, qaybta kaalmada geni, hebert jones. So [facility] [phone].    ATENCIÓN: Si habla español, tiene a wilde disposición servicios gratuitos de asistencia  lingüística. Delmy al [phone].    We comply with applicable federal civil rights laws and [place] laws. We do not discriminate on the basis of race, color, national origin, age, disability, sex, sexual orientation, or gender identity.            Thank you!     Thank you for choosing [facility]  for your care. Our goal is always to provide you with excellent care. Hearing back from our patients is one way we can continue to improve our services. Please take a few minutes to complete the written survey that you may receive in the mail after your visit with us. Thank you!             Your Updated Medication List - Protect others around you: Learn how to safely use, store and throw away your medicines at www.disposemymeds.org.          This list is accurate as of 8/28/18  9:31 AM.  Always use your most recent med list.                   Brand Name Dispense Instructions for use Diagnosis    ARMOUR THYROID 60 MG tablet   Generic drug:  thyroid     90 tablet    TAKE 1 TABLET BY MOUTH DAILY FIRST THING IN THE MORNING ON AN EMPTY STOMACH, NO FOOD FOR AN HOUR    Fatigue, unspecified type, Hypothyroidism, unspecified type       aspirin 81 MG tablet     100 tablet    Take 1 tablet (81 mg) by mouth daily INDICATION: FOR HEART AND CARDIOVASCULAR HEALTH    Hyperlipidemia with target LDL less than 160       Calcium Lactate 500 MG Caps     30 capsule    THREE TIMES A DAY        cholecalciferol 12202 units capsule    VITAMIN D3    40 capsule    TAKE ON THE 1ST, 10TH AND 20TH, WITH A FAT CONTAINING MEAL    Vitamin D deficiency       VITAMIN B 12 PO      Take 50 mcg by mouth    Atypical ductal hyperplasia of breast, Family history of brain tumor

## 2018-08-28 NOTE — PROGRESS NOTES
[facility]   Neurosurgery Consult Note         CC: Chronic neck pain and RUE numbness and paresthesias     Primary care Provider: Maddison Rojas was aked to see this patient by:  Maddison Rojas MD  [facility]  [address] [place] [address]      [place]: Brianda Payton is a 58 year old female that presents to clinic with a complaint of chronic neck pain and RUE numbness and paresthesias in the C6 distribution. 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  She describes having pain in the right side of her neck and trapezius that seems to extend down her right upper extremity in the C6 distribution with numbness and tingling.  She often feels that the numbness and tingling is starting at her ear on the right.  She has been treated with deep tissue massage, chiropractic care, ultrasound and other forms of conservative treatment except for physical therapy and epidural steroid injections.  She has recently had decreased range of motion in her neck and numbness into her hands.  She has had some visual changes including blurriness but no visual loss.  She does not have any clear complaint of weakness in her right upper extremity.      Past Medical History:   Diagnosis Date     Atypical ductal hyperplasia of breast 10/29/2007     Benign thyroid cyst      Menopause 1997       Past Surgical History:   Procedure Laterality Date     C TOTAL ABDOM HYSTERECTOMY  1997    large fibroid uterus.  ovaries retained       HC EXCISION BREAST LESION, OPEN >=1  2007    Right breast     MAMMOPLASTY REDUCTION  2005     US ASPIRATION THYROID CYST         Current Outpatient Prescriptions   Medication     ARMOUR THYROID 60 MG tablet     aspirin 81 MG tablet     Calcium Lactate 500 MG CAPS     cholecalciferol (VITAMIN D3) 71797 UNITS capsule     Cyanocobalamin (VITAMIN B 12 PO)     No current facility-administered medications for this  visit.        Allergies   Allergen Reactions     Seasonal Allergies Other (See Comments)     Nasal stuffiness       Social History     Social History     Marital status:      Spouse name: N/A     Number of children: 2     Years of education: N/A     Occupational History      Self     Social History Main Topics     Smoking status: Never Smoker     Smokeless tobacco: Never Used      Comment: never smoked     Alcohol use Yes      Comment: socially     Drug use: No     Sexual activity: Yes     Other Topics Concern     None     Social History Narrative       Family History   Problem Relation Age of Onset     Melanoma Sister      HEART DISEASE Mother      C.A.D. Mother      Hypertension Mother      Circulatory Mother      Lipids Mother      C.A.D. Maternal Grandmother      Hypertension Maternal Grandmother      Arthritis Maternal Grandmother      Lipids Maternal Grandmother      C.A.D. Paternal Grandfather      Prostate Cancer Paternal Grandfather      Alzheimer Disease Paternal Grandfather      Connective Tissue Disorder Paternal Grandfather      Asthma Paternal Grandfather      Breast Cancer Paternal Grandmother      METASTATIC     Genitourinary Problems Paternal Grandmother      Gynecology Paternal Grandmother      Osteoperosis Paternal Grandmother      Circulatory Father      Cancer Father      Thyroid cancer     Depression Father      Lipids Maternal Grandfather          Review Of Systems  Skin: negative  Eyes: negative  Ears/Nose/Throat: negative  Respiratory: No shortness of breath, dyspnea on exertion, cough, or hemoptysis  Cardiovascular: negative  Gastrointestinal: negative  Genitourinary: negative  Musculoskeletal: as above  Neurologic: as above  Psychiatric: negative  Hematologic/Lymphatic/Immunologic: negative  Endocrine: negative    B/P: 115/74, T: 98.2, P: 68, R: Data Unavailable    Examination:  Normal affect and mood  No obvious labored respiration  No skin lesions noted   No obvious pitting  edema  No abnormal psychiatric behavior  No obvious musculoskeletal abnormalities   Awake  Alert  Oriented x 3  Pupils reactive and EOMI  Speech clear  Cranial nerves II - XII intact  Face symmetric  Tongue midline  Neck nontender  Normal ROM of neck  Motor exam    RUE - deltoid 5/5, biceps 5/5, triceps 5/5, wrist extension 5/5, wrist flexion 5/5,  5/5   LUE - deltoid 5/5, biceps 5/5, triceps 5/5, wrist extension 5/5, wrist flexion 5/5,  5/5     Sensation decreased in the right C6 distribution  Clonus negative  DTR 1 + throughout the patella tendons  Francis's sign negative  Spurling's sign slightly positive  Ambulation stable    Imaging:   MRI of her brain is normal  MRI of the cervical spine reveals multilevel cervical spondylosis with degenerative disc disease that is worse at C4-5, C5-6 and C6-7 with loss of lordosis.  There is no spinal cord compression or compression of any nerve roots is noted.      Diagnosis:  Chronic neck pain  Right upper extremity C6 paresthesias      Assessment/Plan:   I recommended an EMG/nerve conduction study of her right upper extremity and physical therapy.  There is no role for surgical intervention at this time.        Som Price MD, MS, FAANS  Neurosurgeon  [facility]  [facility]  [address], [address]  [place]  [address]  Tel [phone]

## 2018-08-28 NOTE — LETTER
8/28/2018         RE: Brianda Payton  [address] [facility] [place] [facility] [address]        Dear Colleague,    Thank you for referring your patient, Brianda Payton, to the [facility]. Please see a copy of my visit note below.    [facility]   Neurosurgery Consult Note         CC: Chronic neck pain and RUE numbness and paresthesias     Primary care Provider: Maddison Rojas    I was aked to see this patient by:  Maddison Rojas MD  [facility]  [address]  [place] [address]      [place]: Brianda Payton is a 58 year old female that presents to clinic with a complaint of chronic neck pain and RUE numbness and paresthesias in the C6 distribution. 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  She describes having pain in the right side of her neck and trapezius that seems to extend down her right upper extremity in the C6 distribution with numbness and tingling.  She often feels that the numbness and tingling is starting at her ear on the right.  She has been treated with deep tissue massage, chiropractic care, ultrasound and other forms of conservative treatment except for physical therapy and epidural steroid injections.  She has recently had decreased range of motion in her neck and numbness into her hands.  She has had some visual changes including blurriness but no visual loss.  She does not have any clear complaint of weakness in her right upper extremity.      Past Medical History:   Diagnosis Date     Atypical ductal hyperplasia of breast 10/29/2007     Benign thyroid cyst      Menopause 1997       Past Surgical History:   Procedure Laterality Date     C TOTAL ABDOM HYSTERECTOMY  1997    large fibroid uterus.  ovaries retained       HC EXCISION BREAST LESION, OPEN >=1  2007    Right breast     MAMMOPLASTY REDUCTION  2005     US ASPIRATION THYROID CYST         Current Outpatient  Prescriptions   Medication     TERESE THYROID 60 MG tablet     aspirin 81 MG tablet     Calcium Lactate 500 MG CAPS     cholecalciferol (VITAMIN D3) 36820 UNITS capsule     Cyanocobalamin (VITAMIN B 12 PO)     No current facility-administered medications for this visit.        Allergies   Allergen Reactions     Seasonal Allergies Other (See Comments)     Nasal stuffiness       Social History     Social History     Marital status:      Spouse name: N/A     Number of children: 2     Years of education: N/A     Occupational History      Self     Social History Main Topics     Smoking status: Never Smoker     Smokeless tobacco: Never Used      Comment: never smoked     Alcohol use Yes      Comment: socially     Drug use: No     Sexual activity: Yes     Other Topics Concern     None     Social History Narrative       Family History   Problem Relation Age of Onset     Melanoma Sister      HEART DISEASE Mother      C.A.D. Mother      Hypertension Mother      Circulatory Mother      Lipids Mother      C.A.D. Maternal Grandmother      Hypertension Maternal Grandmother      Arthritis Maternal Grandmother      Lipids Maternal Grandmother      C.A.D. Paternal Grandfather      Prostate Cancer Paternal Grandfather      Alzheimer Disease Paternal Grandfather      Connective Tissue Disorder Paternal Grandfather      Asthma Paternal Grandfather      Breast Cancer Paternal Grandmother      METASTATIC     Genitourinary Problems Paternal Grandmother      Gynecology Paternal Grandmother      Osteoperosis Paternal Grandmother      Circulatory Father      Cancer Father      Thyroid cancer     Depression Father      Lipids Maternal Grandfather          Review Of Systems  Skin: negative  Eyes: negative  Ears/Nose/Throat: negative  Respiratory: No shortness of breath, dyspnea on exertion, cough, or hemoptysis  Cardiovascular: negative  Gastrointestinal: negative  Genitourinary: negative  Musculoskeletal: as above  Neurologic: as  above  Psychiatric: negative  Hematologic/Lymphatic/Immunologic: negative  Endocrine: negative    B/P: 115/74, T: 98.2, P: 68, R: Data Unavailable    Examination:  Normal affect and mood  No obvious labored respiration  No skin lesions noted   No obvious pitting edema  No abnormal psychiatric behavior  No obvious musculoskeletal abnormalities   Awake  Alert  Oriented x 3  Pupils reactive and EOMI  Speech clear  Cranial nerves II - XII intact  Face symmetric  Tongue midline  Neck nontender  Normal ROM of neck  Motor exam    RUE - deltoid 5/5, biceps 5/5, triceps 5/5, wrist extension 5/5, wrist flexion 5/5,  5/5   LUE - deltoid 5/5, biceps 5/5, triceps 5/5, wrist extension 5/5, wrist flexion 5/5,  5/5     Sensation decreased in the right C6 distribution  Clonus negative  DTR 1 + throughout the patella tendons  Francis's sign negative  Spurling's sign slightly positive  Ambulation stable    Imaging:   MRI of her brain is normal  MRI of the cervical spine reveals multilevel cervical spondylosis with degenerative disc disease that is worse at C4-5, C5-6 and C6-7 with loss of lordosis.  There is no spinal cord compression or compression of any nerve roots is noted.      Diagnosis:  Chronic neck pain  Right upper extremity C6 paresthesias      Assessment/Plan:   I recommended an EMG/nerve conduction study of her right upper extremity and physical therapy.  There is no role for surgical intervention at this time.        Som Price MD, MS, FAANS  Neurosurgeon  [facility]  [address], [address]  [place]  [address]  Tel [phone]    Again, thank you for allowing me to participate in the care of your patient.        Sincerely,        Som Price MD

## 2018-08-28 NOTE — NURSING NOTE
"Brianda Payton is a 58 year old female who presents for:  Chief Complaint   Patient presents with     Neurologic Problem     referral from Dr. Rojas for cervical         Vitals:    There were no vitals filed for this visit.    BMI:  Estimated body mass index is 27.23 kg/(m^2) as calculated from the following:    Height as of 10/27/17: 5' 9\" (1.753 m).    Weight as of 10/27/17: 184 lb 6.4 oz (83.6 kg).    Pain Score:  Data Unavailable        Leonor Villalba, ANA, AAS      "

## 2018-08-28 NOTE — PATIENT INSTRUCTIONS
1. Referral to Dr. Tapia for EMG of right arm. Scheduled for Thursday 8/30/18 at 10:00am. We will contact you with the results.   Check in a few minutes prior to appt.   Bring photo ID and insurance card   No lotions to right hand or arm day of appt.   2. Referral for physical therapy.   3. Please call our clinic with any questions or concerns: [phone]

## 2018-08-31 ENCOUNTER — THERAPY VISIT (OUTPATIENT)
Dept: PHYSICAL THERAPY | Facility: CLINIC | Age: 58
End: 2018-08-31
Attending: NEUROLOGICAL SURGERY
Payer: COMMERCIAL

## 2018-08-31 DIAGNOSIS — M54.12 CERVICAL RADICULOPATHY: Primary | ICD-10-CM

## 2018-08-31 PROCEDURE — 97161 PT EVAL LOW COMPLEX 20 MIN: CPT | Mod: GP | Performed by: PHYSICAL THERAPIST

## 2018-08-31 PROCEDURE — 97110 THERAPEUTIC EXERCISES: CPT | Mod: GP | Performed by: PHYSICAL THERAPIST

## 2018-08-31 NOTE — PROGRESS NOTES
[facility] Initial Evaluation  Subjective:  Patient is a 58 year old female presenting with rehab cervical spine hpi.   Brianda Payton is a 58 year old female with a cervical spine condition.  Condition occurred with:  Insidious onset.  Condition occurred: other.  This is a recurrent condition     Patient reports pain:  Cervical right side.  Radiates to:  Shoulder right, hand right, lower arm right and upper arm right.  Pain is described as shooting and is intermittent and reported as 3/10.   Pain is worse during the night.  Exacerbated by: lying on her R side,  Relieved by: sleeping on her L.    Special tests:  MRI and x-ray.  Previous treatment includes chiropractic.      Pertinent medical history includes:  Thyroid problems (overweight, sleep apnea/disorder).      Medication history: thyroid.              Red flags:  None as reported by the patient.                      The pt has had an onset of neck pain and buzzing in the shoulder/arm (R side) for about 3 months. She does report chronic neck discomfort for that she has sought out chiropractic, PT, and massage for. [place] and had a physical upbringing. She feels that when her neck pain started that she was rollerblading and fell/rolled onto her R neck and shoulder region. She was told she has degenerative arthritis in the c/s, mostly between C4-6. She also has had a bulging disc, but has gotten better with an anti-inflammatory diet. Physical activity makes it feel better. Just in the last 4 weeks, it feels that gravity makes her arm worse. Sometimes she will have to hold her arm up so it feels supported. She gets tingling and numbness that shoots down from her neck and down her arm, mainly from the elbow down. It feels throbbing and numb into her hand and finger tips also. She also feels weakness in her hand as well. She has not had an EMG study done.   Objective:  System              Cervical/Thoracic Evaluation  Arom wnl  cervical: + Spurling's.     AROM:  AROM Cervical:    Flexion:            75%  Extension:       75%  Rotation:         Left: 50     Right: 56  Side Bend:      Left: 50%     Right:  50%        Cervical Myotomes:  Cervical myotomes: Weakness noted in  on R compared to L.                  DTR's:  not assessed            Cervical Palpation:  : Decreased sensation through C6 distribition and R hand compared to L.    Tenderness present at Right:    Scalenes and Upper Trap        Cord Sign:  normal                                            General     ROS    Assessment/Plan:    Patient is a 58 year old female with cervical complaints.    Patient has the following significant findings with corresponding treatment plan.                Diagnosis 1:  Cervical radiculopathy  Pain -  hot/cold therapy, electric stimulation, mechanical traction, manual therapy, splint/taping/bracing/orthotics, self management, education, directional preference exercise and home program  Decreased ROM/flexibility - manual therapy and therapeutic exercise  Decreased joint mobility - manual therapy and therapeutic exercise  Decreased strength - therapeutic exercise and therapeutic activities  Decreased proprioception - neuro re-education and therapeutic activities  Impaired muscle performance - neuro re-education  Decreased function - therapeutic activities  Impaired posture - neuro re-education    Therapy Evaluation Codes:   1) History comprised of:   Personal factors that impact the plan of care:      None.    Comorbidity factors that impact the plan of care are:      None.     Medications impacting care: None.  2) Examination of Body Systems comprised of:   Body structures and functions that impact the plan of care:      Cervical spine.   Activity limitations that impact the plan of care are:      Sleeping, carrying objects.  3) Clinical presentation characteristics are:   Stable/Uncomplicated.  4) Decision-Making    Low complexity using  standardized patient assessment instrument and/or measureable assessment of functional outcome.  Cumulative Therapy Evaluation is: Low complexity.    Previous and current functional limitations:  (See Goal Flow Sheet for this information)    Short term and Long term goals: (See Goal Flow Sheet for this information)     Communication ability:  Patient appears to be able to clearly communicate and understand verbal and written communication and follow directions correctly.  Treatment Explanation - The following has been discussed with the patient:   RX ordered/plan of care  Anticipated outcomes  Possible risks and side effects  This patient would benefit from PT intervention to resume normal activities.   Rehab potential is good.    Frequency:  1 X week, once daily  Duration:  for 12 weeks  Discharge Plan:  Achieve all LTG.  Independent in home treatment program.  Reach maximal therapeutic benefit.    Please refer to the daily flowsheet for treatment today, total treatment time and time spent performing 1:1 timed codes.

## 2018-08-31 NOTE — MR AVS SNAPSHOT
"              After Visit Summary   8/31/2018    Brianda Payton    MRN: [id number]           Patient Information     Date Of Birth          1960        Visit Information        Provider Department      8/31/2018 9:40 AM [facility] Physical Therapy [facility]        Today's Diagnoses     Cervical radiculopathy    -  1       Follow-ups after your visit        Your next 10 appointments already scheduled     Oct 29, 2018  9:30 AM CDT   (Arrive by 9:20 AM)   MA DIAGNOSTIC BILATERAL W/ YA with [id number]   [facility] ([facility])    [address] [place] [address], 2nd Floor  [facility] [address] [phone]           Do not use any powder, lotion or deodorant under your arms or on your breast. If you do, we will ask you to remove it before your exam.  Wear comfortable, two-piece clothing.  If you have any allergies, tell your care team.  Bring any previous mammograms from other facilities or have them mailed to the breast center.  Three-dimensional (3D) mammograms are available at [place] locations in [facility], [facility], [facility], and [place]. [facility] locations include [place] [facility] in [place]. Benefits of 3D mammograms include: - Improved rate of cancer detection - Decreases your chance of having to go back for more tests, which means fewer: - \"False-positive\" results (This means that there is an abnormal area but it isn't cancer.) - Invasive testing procedures, such as a biopsy or surgery - Can provide clearer images of the breast if you have dense breast tissue. 3D mammography is an optional exam that anyone can have with a 2D mammogram. It doesn't replace or take the place of a 2D mammogram. 2D mammograms remain an effective screening test for all women.  Not all insurance companies cover the cost of a 3D mammogram. Check with your insurance.            Oct 29, 2018 10:30 AM CDT "   (Arrive by 10:15 AM)   Return Visit with Alysa Padron MD   [facility] ([facility])    [address]  [address]  [facility] [address] [phone]              Who to contact     If you have questions or need follow up information about today's clinic visit or your schedule please contact [facility] PHYSICAL THERAPY [facility] directly at [phone].  Normal or non-critical lab and imaging results will be communicated to you by BillGuardhart, letter or phone within 4 business days after the clinic has received the results. If you do not hear from us within 7 days, please contact the clinic through Nimbixt or phone. If you have a critical or abnormal lab result, we will notify you by phone as soon as possible.  Submit refill requests through [email or web address] or call your pharmacy and they will forward the refill request to us. Please allow 3 business days for your refill to be completed.          Additional Information About Your Visit        BillGuardharMobile Cohesion Information     [email or web address] gives you secure access to your electronic health record. If you see a primary care provider, you can also send messages to your care team and make appointments. If you have questions, please call your primary care clinic.  If you do not have a primary care provider, please call [phone] and they will assist you.        Care EveryWhere ID     This is your Care EveryWhere ID. This could be used by other organizations to access your [facility] medical records  [id number]        Your Vitals Were     Last Period                   09/27/1997            Blood Pressure from Last 3 Encounters:   08/28/18 115/74   10/27/17 129/73   10/13/17 110/70    Weight from Last 3 Encounters:   10/27/17 83.6 kg (184 lb 6.4 oz)   10/13/17 84.5 kg (186 lb 3.2 oz)   12/27/16 84.4 kg (186 lb)              We Performed the Following     HC PT EVAL, LOW COMPLEXITY     [facility] INITIAL EVAL REPORT     THERAPEUTIC EXERCISES         Primary Care Provider Office Phone # Fax #    Maddison Rojas [phone]       [facility] [address] [address]        Equal Access to Services     MITCHELL GUZMAN : Allyssa oscar pierce dominique Kendrick, waaxda luqadaha, qaybta kaalmada adeliseda, hebert marley laRdrodriguez jones. So [facility] [phone].    ATENCIÓN: Si habla español, tiene a wilde disposición servicios gratuitos de asistencia lingüística. Llame al [phone].    We comply with applicable federal civil rights laws and [place] laws. We do not discriminate on the basis of race, color, national origin, age, disability, sex, sexual orientation, or gender identity.            Thank you!     Thank you for choosing [facility] PHYSICAL THERAPY [facility]  for your care. Our goal is always to provide you with excellent care. Hearing back from our patients is one way we can continue to improve our services. Please take a few minutes to complete the written survey that you may receive in the mail after your visit with us. Thank you!             Your Updated Medication List - Protect others around you: Learn how to safely use, store and throw away your medicines at www.disposemymeds.org.          This list is accurate as of 8/31/18 11:02 AM.  Always use your most recent med list.                   Brand Name Dispense Instructions for use Diagnosis    ARMOUR THYROID 60 MG tablet   Generic drug:  thyroid     90 tablet    TAKE 1 TABLET BY MOUTH DAILY FIRST THING IN THE MORNING ON AN EMPTY STOMACH, NO FOOD FOR AN HOUR    Fatigue, unspecified type, Hypothyroidism, unspecified type       aspirin 81 MG tablet     100 tablet    Take 1 tablet (81 mg) by mouth daily INDICATION: FOR HEART AND CARDIOVASCULAR HEALTH    Hyperlipidemia with target LDL less than 160       Calcium Lactate 500 MG Caps     30 capsule    THREE TIMES A DAY        cholecalciferol 04503 units capsule    VITAMIN D3    40 capsule    TAKE ON THE 1ST, 10TH AND 20TH,  WITH A FAT CONTAINING MEAL    Vitamin D deficiency       VITAMIN B 12 PO      Take 50 mcg by mouth    Atypical ductal hyperplasia of breast, Family history of brain tumor

## 2018-10-29 ENCOUNTER — ONCOLOGY VISIT (OUTPATIENT)
Dept: ONCOLOGY | Facility: CLINIC | Age: 58
End: 2018-10-29
Attending: INTERNAL MEDICINE
Payer: COMMERCIAL

## 2018-10-29 ENCOUNTER — RADIANT APPOINTMENT (OUTPATIENT)
Dept: MAMMOGRAPHY | Facility: CLINIC | Age: 58
End: 2018-10-29
Payer: COMMERCIAL

## 2018-10-29 VITALS
WEIGHT: 183.3 LBS | DIASTOLIC BLOOD PRESSURE: 76 MMHG | TEMPERATURE: 100.6 F | BODY MASS INDEX: 27.15 KG/M2 | OXYGEN SATURATION: 95 % | SYSTOLIC BLOOD PRESSURE: 123 MMHG | RESPIRATION RATE: 16 BRPM | HEART RATE: 86 BPM | HEIGHT: 69 IN

## 2018-10-29 DIAGNOSIS — Z12.31 VISIT FOR SCREENING MAMMOGRAM: ICD-10-CM

## 2018-10-29 DIAGNOSIS — R13.10 ODYNOPHAGIA: Primary | ICD-10-CM

## 2018-10-29 LAB
DEPRECATED S PYO AG THROAT QL EIA: NORMAL
SPECIMEN SOURCE: NORMAL

## 2018-10-29 PROCEDURE — 99213 OFFICE O/P EST LOW 20 MIN: CPT | Mod: ZP | Performed by: INTERNAL MEDICINE

## 2018-10-29 PROCEDURE — G0463 HOSPITAL OUTPT CLINIC VISIT: HCPCS | Mod: ZF

## 2018-10-29 PROCEDURE — 87081 CULTURE SCREEN ONLY: CPT | Performed by: INTERNAL MEDICINE

## 2018-10-29 PROCEDURE — 87880 STREP A ASSAY W/OPTIC: CPT | Performed by: INTERNAL MEDICINE

## 2018-10-29 ASSESSMENT — PAIN SCALES - GENERAL: PAINLEVEL: NO PAIN (0)

## 2018-10-29 NOTE — NURSING NOTE
"Oncology Rooming Note    October 29, 2018 10:56 AM   Brianda Payton is a 58 year old female who presents for:    Chief Complaint   Patient presents with     Oncology Clinic Visit     return - breast ca      Initial Vitals: /76 (BP Location: Right arm, Patient Position: Chair, Cuff Size: Adult Regular)  Pulse 86  Temp 100.6  F (38.1  C) (Oral)  Resp 16  Ht 1.753 m (5' 9.02\")  Wt 83.1 kg (183 lb 4.8 oz)  LMP 09/27/1997  SpO2 95%  BMI 27.06 kg/m2 Estimated body mass index is 27.06 kg/(m^2) as calculated from the following:    Height as of this encounter: 1.753 m (5' 9.02\").    Weight as of this encounter: 83.1 kg (183 lb 4.8 oz). Body surface area is 2.01 meters squared.  No Pain (0) Comment: Data Unavailable   Patient's last menstrual period was 09/27/1997.  Allergies reviewed: Yes  Medications reviewed: Yes    Medications: Medication refills not needed today.  Pharmacy name entered into Teravac:    [id number] - [place], [place] - [address] [place] [address] AT [facility]  [id number] - [place], [place] - [address] [place] [address] AT [facility] OF [address]     Clinical concerns: has a fever today would like a strep     6 minutes for nursing intake (face to face time)     Annalise Cazlada CMA            "

## 2018-10-29 NOTE — PROGRESS NOTES
October 29, 2018    REASON FOR VISIT:  I am seeing Ms. Brinada Payton in followup for atypical ductal hyperplasia of her breast.       HISTORY OF PRESENT ILLNESS:  Brianda is a 58-year-old female, otherwise healthy, who comes in today for follow up.     Brianda reports that she has been healthy and well.  She has a strong family history of melanoma as well as brain tumors as will be outlined below.  She reports that in 2005 she underwent bilateral breast reduction.  In 2006 she was found to have an abnormal mammogram.  Biopsy revealed atypical ductal hyperplasia.  I do not have copies of all of these reports.  She was originally told to undergo bilateral mastectomies.  She declined this and sought out further consultation.  She eventually met Dr. Latosha Leger.  She was recommended to undergo high-risk screening with breast MRI and mammogram.  She alternates these every 6 months.  She has had two additional negative breast biopsies at [facility] in the last couple of years.  These were both benign.       It was discussed with her whether to consider tamoxifen therapy as a preventative.  She is not interested in this.       Brianda returns today for followup.  Overall, she says that she is feeling well.  She has no issues with fevers or chills, chest pain or shortness of breath.  She has no nausea, vomiting, diarrhea or constipation.  She continues on mammogram and breast MRI for breast cancer screening.       She has been seeing a functional medicine physician.      She has an upper respiratory infection today.  + rhinorrhea.  No fevers/chills.  No chest pain or shortness of breath.    REVIEW OF SYSTEMS:  Her 10-point review of systems is otherwise negative.      PAST MEDICAL HISTORY:   1.  Atypical ductal hyperplasia of the breast.   2.  Hot flashes.   3.  Benign thyroid nodules that have been aspirated.   4.  Hypothyroidism.   5.  Obstructive sleep apnea.   6.  Allergic rhinitis.      MEDICATIONS:  Reviewed in the  "EMR.       ALLERGIES:  She has seasonal allergies.  No drug allergies.      SOCIAL HISTORY AND FH:  Unchanged     PHYSICAL EXAMINATION: /76 (BP Location: Right arm, Patient Position: Chair, Cuff Size: Adult Regular)  Pulse 86  Temp 100.6  F (38.1  C) (Oral)  Resp 16  Ht 1.753 m (5' 9.02\")  Wt 83.1 kg (183 lb 4.8 oz)  LMP 09/27/1997  SpO2 95%  BMI 27.06 kg/m2    GENERAL:  She is well-appearing, in no apparent distress.   HEENT:  Exam reveals no icterus.  Her oropharynx with postnasal drip, no exudates  NECK:  Supple.     LYMPH:  There is no cervical, supraclavicular or axillary adenopathy.   HEART:  Regular.   LUNGS:  Clear bilaterally.   ABDOMEN:  Soft, nontender, nondistended.  There is no palpable hepatosplenomegaly.   BREASTS:  She is status post bilateral breast reduction.  She has a scar at the 9 o'clock position involving the upper outer quadrant of her right breast.  There are no nodules or masses in either breast.  There is no axillary adenopathy, no nipple discharge, and no changes in the skin.   NEUROLOGIC:  Exam is unremarkable.    SKIN:  There are no skin rashes.       LABORATORY:  No laboratories.       ASSESSMENT AND PLAN:  This is a 58-year-old female with a history of atypical ductal hyperplasia involving the right breast, status post lumpectomy, currently on observation for high-risk disease involving breast MRI and mammogram.       1.  Atypical ductal hyperplasia.  She has no evidence of breast cancer today.  Her last mammogram was today. We discussed that she would be due for a breast MRI in April.  She is alternating these every 6 months.       We spent time discussing chemo prevention using tamoxifen or an aromatase inhibitor.  We discussed the pros and cons of this.  She is not interested.       We discussed weight management.  She has been seeing a functional medicine physician and was told her estrogen levels are \"high.\"  We discussed exercise of at least 150 minutes per week as " well as a diet high in fruits and vegetables for evaluation and continued overall health goals.       With her strong family history of melanoma and brain tumors, I recommended a referral to Genetics.  This referral was placed.        2.  She has a benign thyroid.       3.  She sees her primary care physician for other health care prevention.      I will see her back annually with her mammogram.  She is due for a breast MRI in 6 months and this order was placed in the chart.         DAYDAY DEMPSEY MD

## 2018-10-29 NOTE — LETTER
10/29/2018       RE: Brianda Payton  [address]  [facility] [address]     Dear Colleague,    Thank you for referring your patient, Brianda Payton, to the [facility]. Please see a copy of my visit note below.    October 29, 2018    REASON FOR VISIT:  I am seeing Ms. Brianda Payton in followup for atypical ductal hyperplasia of her breast.       HISTORY OF PRESENT ILLNESS:  Brianda is a 58-year-old female, otherwise healthy, who comes in today for follow up.     Brianda reports that she has been healthy and well.  She has a strong family history of melanoma as well as brain tumors as will be outlined below.  She reports that in 2005 she underwent bilateral breast reduction.  In 2006 she was found to have an abnormal mammogram.  Biopsy revealed atypical ductal hyperplasia.  I do not have copies of all of these reports.  She was originally told to undergo bilateral mastectomies.  She declined this and sought out further consultation.  She eventually met Dr. Latosha Leger.  She was recommended to undergo high-risk screening with breast MRI and mammogram.  She alternates these every 6 months.  She has had two additional negative breast biopsies at [facility] in the last couple of years.  These were both benign.       It was discussed with her whether to consider tamoxifen therapy as a preventative.  She is not interested in this.       Brianda returns today for followup.  Overall, she says that she is feeling well.  She has no issues with fevers or chills, chest pain or shortness of breath.  She has no nausea, vomiting, diarrhea or constipation.  She continues on mammogram and breast MRI for breast cancer screening.       She has been seeing a functional medicine physician.      She has an upper respiratory infection today.  + rhinorrhea.  No fevers/chills.  No chest pain or shortness of breath.    REVIEW OF SYSTEMS:  Her 10-point review of systems is otherwise negative.      PAST MEDICAL  "HISTORY:   1.  Atypical ductal hyperplasia of the breast.   2.  Hot flashes.   3.  Benign thyroid nodules that have been aspirated.   4.  Hypothyroidism.   5.  Obstructive sleep apnea.   6.  Allergic rhinitis.      MEDICATIONS:  Reviewed in the EMR.       ALLERGIES:  She has seasonal allergies.  No drug allergies.      SOCIAL HISTORY AND FH:  Unchanged     PHYSICAL EXAMINATION: /76 (BP Location: Right arm, Patient Position: Chair, Cuff Size: Adult Regular)  Pulse 86  Temp 100.6  F (38.1  C) (Oral)  Resp 16  Ht 1.753 m (5' 9.02\")  Wt 83.1 kg (183 lb 4.8 oz)  LMP 09/27/1997  SpO2 95%  BMI 27.06 kg/m2    GENERAL:  She is well-appearing, in no apparent distress.   HEENT:  Exam reveals no icterus.  Her oropharynx with postnasal drip, no exudates  NECK:  Supple.     LYMPH:  There is no cervical, supraclavicular or axillary adenopathy.   HEART:  Regular.   LUNGS:  Clear bilaterally.   ABDOMEN:  Soft, nontender, nondistended.  There is no palpable hepatosplenomegaly.   BREASTS:  She is status post bilateral breast reduction.  She has a scar at the 9 o'clock position involving the upper outer quadrant of her right breast.  There are no nodules or masses in either breast.  There is no axillary adenopathy, no nipple discharge, and no changes in the skin.   NEUROLOGIC:  Exam is unremarkable.    SKIN:  There are no skin rashes.       LABORATORY:  No laboratories.       ASSESSMENT AND PLAN:  This is a 58-year-old female with a history of atypical ductal hyperplasia involving the right breast, status post lumpectomy, currently on observation for high-risk disease involving breast MRI and mammogram.       1.  Atypical ductal hyperplasia.  She has no evidence of breast cancer today.  Her last mammogram was today. We discussed that she would be due for a breast MRI in April.  She is alternating these every 6 months.       We spent time discussing chemo prevention using tamoxifen or an aromatase inhibitor.  We discussed " "the pros and cons of this.  She is not interested.       We discussed weight management.  She has been seeing a functional medicine physician and was told her estrogen levels are \"high.\"  We discussed exercise of at least 150 minutes per week as well as a diet high in fruits and vegetables for evaluation and continued overall health goals.       With her strong family history of melanoma and brain tumors, I recommended a referral to Genetics.  This referral was placed.        2.  She has a benign thyroid.       3.  She sees her primary care physician for other health care prevention.      I will see her back annually with her mammogram.  She is due for a breast MRI in 6 months and this order was placed in the chart.         DAYDAY DEMPSEY MD              "

## 2018-10-29 NOTE — MR AVS SNAPSHOT
"              After Visit Summary   10/29/2018    Brianda Payton    MRN: [id number]           Patient Information     Date Of Birth          1960        Visit Information        Provider Department      10/29/2018 10:30 AM Alysa Padron MD [facility]        Today's Diagnoses     Odynophagia    -  1       Follow-ups after your visit        Who to contact     If you have questions or need follow up information about today's clinic visit or your schedule please contact [facility] directly at [phone].  Normal or non-critical lab and imaging results will be communicated to you by Oodlehart, letter or phone within 4 business days after the clinic has received the results. If you do not hear from us within 7 days, please contact the clinic through [employer] or phone. If you have a critical or abnormal lab result, we will notify you by phone as soon as possible.  Submit refill requests through [employer] or call your pharmacy and they will forward the refill request to us. Please allow 3 business days for your refill to be completed.          Additional Information About Your Visit        MyChart Information     CommonFloor gives you secure access to your electronic health record. If you see a primary care provider, you can also send messages to your care team and make appointments. If you have questions, please call your primary care clinic.  If you do not have a primary care provider, please call [phone] and they will assist you.        Care EveryWhere ID     This is your Care EveryWhere ID. This could be used by other organizations to access your [place] medical records  [id number]        Your Vitals Were     Pulse Temperature Respirations Height Last Period Pulse Oximetry    86 100.6  F (38.1  C) (Oral) 16 1.753 m (5' 9.02\") 09/27/1997 95%    BMI (Body Mass Index)                   27.06 kg/m2            Blood Pressure from Last 3 Encounters:   10/29/18 123/76 "   08/28/18 115/74   10/27/17 129/73    Weight from Last 3 Encounters:   10/29/18 83.1 kg (183 lb 4.8 oz)   10/27/17 83.6 kg (184 lb 6.4 oz)   10/13/17 84.5 kg (186 lb 3.2 oz)              We Performed the Following     Beta strep group A culture     Rapid strep screen        Primary Care Provider Office Phone # Fax #    Maddison Rojas [phone]       [facility] [address]  [facility] [address]        Equal Access to Services     [facility]: Hadii oscar pierce hadalma rosa Soelbert, waaxda sohan, qaybta kaalmaeliezer arechiga, hebert hdz . So [facility] [phone].    ATENCIÓN: Si habla español, tiene a wilde disposición servicios gratuitos de asistencia lingüística. [facility] [phone].    We comply with applicable federal civil rights laws and [place] laws. We do not discriminate on the basis of race, color, national origin, age, disability, sex, sexual orientation, or gender identity.            Thank you!     Thank you for choosing [facility]  for your care. Our goal is always to provide you with excellent care. Hearing back from our patients is one way we can continue to improve our services. Please take a few minutes to complete the written survey that you may receive in the mail after your visit with us. Thank you!             Your Updated Medication List - Protect others around you: Learn how to safely use, store and throw away your medicines at www.disposemymeds.org.          This list is accurate as of 10/29/18 11:59 PM.  Always use your most recent med list.                   Brand Name Dispense Instructions for use Diagnosis    ARMOUR THYROID 60 MG tablet   Generic drug:  thyroid     90 tablet    TAKE 1 TABLET BY MOUTH DAILY FIRST THING IN THE MORNING ON AN EMPTY STOMACH, NO FOOD FOR AN HOUR    Fatigue, unspecified type, Hypothyroidism, unspecified type       aspirin 81 MG tablet     100 tablet    Take 1 tablet (81 mg) by mouth  daily INDICATION: FOR HEART AND CARDIOVASCULAR HEALTH    Hyperlipidemia with target LDL less than 160       Calcium Lactate 500 MG Caps     30 capsule    THREE TIMES A DAY        cholecalciferol 28339 units capsule    VITAMIN D3    40 capsule    TAKE ON THE 1ST, 10TH AND 20TH, WITH A FAT CONTAINING MEAL    Vitamin D deficiency       VITAMIN B 12 PO      Take 50 mcg by mouth    Atypical ductal hyperplasia of breast, Family history of brain tumor

## 2018-10-31 ENCOUNTER — CARE COORDINATION (OUTPATIENT)
Dept: ONCOLOGY | Facility: CLINIC | Age: 58
End: 2018-10-31

## 2018-10-31 ENCOUNTER — TELEPHONE (OUTPATIENT)
Dept: ONCOLOGY | Facility: CLINIC | Age: 58
End: 2018-10-31

## 2018-10-31 NOTE — TELEPHONE ENCOUNTER
[facility] Telephone Triage Note    Assessment: Patient called in to triage reporting the following symptoms: worsened symptoms since being seen on 10/29 and have strep culture done. Let Brianda know her rapid strep was negative and strep culture is still growing - no positive result to report at this time. She has had worsened laryngitis, very hoarse on the phone today as well as productive cough with green phlegm. She has consistent temperatures for the past 2 days of 100.1-100.9. She also reported coughing up blood. At this point I told her she needs to be seen in urgent care - this is not expected even if strep culture were to come back positive. She then clarified it is flecks of blood with the green sputum. No regina blood. . She says she still has her tonsils and believes them to be red but not swollen.     Recommendations: .  Urgent care visit this morning. Plans to be seen at [facility] - she is familiar with this clinic has been seen there previously.    Follow-Up: Patient voiced understanding of advice and/or instructions given.     Routed to Dr. Padron and RNCC Dodie Valdivia

## 2018-10-31 NOTE — PROGRESS NOTES
"Returned call to patient and left a VM on home/cell phone to return call to discuss negative Strep A throat culture. Patient stated is spitting up \"dark green\" sputum this AM and did not mention if is febrile. Patient going out of town tomorrow asking for Dr Padron to write a Z Spencer. Will await a return call to discuss further symptoms. Dodie Valdivia RN, BSN      "

## 2018-11-01 LAB
BACTERIA SPEC CULT: NORMAL
Lab: NORMAL
SPECIMEN SOURCE: NORMAL

## 2019-07-09 DIAGNOSIS — Z85.3 HX: BREAST CANCER: Primary | ICD-10-CM

## 2019-07-09 DIAGNOSIS — Z78.0 ASYMPTOMATIC POSTMENOPAUSAL STATUS: ICD-10-CM

## 2019-09-27 ENCOUNTER — HEALTH MAINTENANCE LETTER (OUTPATIENT)
Age: 59
End: 2019-09-27

## 2019-12-23 ENCOUNTER — ANCILLARY PROCEDURE (OUTPATIENT)
Dept: BONE DENSITY | Facility: CLINIC | Age: 59
End: 2019-12-23
Attending: INTERNAL MEDICINE
Payer: COMMERCIAL

## 2019-12-23 ENCOUNTER — ANCILLARY PROCEDURE (OUTPATIENT)
Dept: MRI IMAGING | Facility: CLINIC | Age: 59
End: 2019-12-23
Attending: INTERNAL MEDICINE
Payer: COMMERCIAL

## 2019-12-23 DIAGNOSIS — Z85.3 HX: BREAST CANCER: ICD-10-CM

## 2019-12-23 DIAGNOSIS — Z78.0 ASYMPTOMATIC POSTMENOPAUSAL STATUS: ICD-10-CM

## 2019-12-23 RX ORDER — GADOBUTROL 604.72 MG/ML
10 INJECTION INTRAVENOUS ONCE
Status: COMPLETED | OUTPATIENT
Start: 2019-12-23 | End: 2019-12-23

## 2019-12-23 RX ADMIN — GADOBUTROL 8 ML: 604.72 INJECTION INTRAVENOUS at 13:04

## 2019-12-23 NOTE — DISCHARGE INSTRUCTIONS
MRI Contrast Discharge Instructions    The IV contrast you received today will pass out of your body in your  urine. This will happen in the next 24 hours. You will not feel this process.  Your urine will not change color.    Drink at least 4 extra glasses of water or juice today (unless your doctor  has restricted your fluids). This reduces the stress on your kidneys.  You may take your regular medicines.    If you are on dialysis: It is best to have dialysis today.    If you have a reaction: Most reactions happen right away. If you have  any new symptoms after leaving the hospital (such as hives or swelling),  call your hospital at the correct number below. Or call your family doctor.  If you have breathing distress or wheezing, call 911.    Special instructions: ***    I have read and understand the above information.    Signature:______________________________________ Date:___________    Staff:__________________________________________ Date:___________     Time:__________    [facility] Departments:    ___[place]: [phone]  ___[facility]: [phone]  ___[place]: [phone] ___[facility]: [phone]  ___[facility]: [phone]  ___[facility]: [phone]  ___[phone]  ___[facility]: [phone]  ___[phone]

## 2020-09-15 DIAGNOSIS — N60.99 ATYPICAL DUCTAL HYPERPLASIA OF BREAST: Primary | ICD-10-CM

## 2020-09-30 ENCOUNTER — HOSPITAL ENCOUNTER (OUTPATIENT)
Dept: MAMMOGRAPHY | Facility: CLINIC | Age: 60
Discharge: HOME | End: 2020-09-30
Attending: INTERNAL MEDICINE | Admitting: INTERNAL MEDICINE
Payer: COMMERCIAL

## 2020-09-30 DIAGNOSIS — N60.99 ATYPICAL DUCTAL HYPERPLASIA OF BREAST: ICD-10-CM

## 2020-09-30 PROCEDURE — 77063 BREAST TOMOSYNTHESIS BI: CPT

## 2021-01-09 ENCOUNTER — HEALTH MAINTENANCE LETTER (OUTPATIENT)
Age: 61
End: 2021-01-09

## 2021-08-11 ENCOUNTER — TRANSFERRED RECORDS (OUTPATIENT)
Dept: HEALTH INFORMATION MANAGEMENT | Facility: CLINIC | Age: 61
End: 2021-08-11

## 2021-10-23 ENCOUNTER — HEALTH MAINTENANCE LETTER (OUTPATIENT)
Age: 61
End: 2021-10-23

## 2022-02-12 ENCOUNTER — HEALTH MAINTENANCE LETTER (OUTPATIENT)
Age: 62
End: 2022-02-12

## 2022-02-25 DIAGNOSIS — G47.33 OBSTRUCTIVE SLEEP APNEA (ADULT) (PEDIATRIC): Primary | ICD-10-CM

## 2022-07-12 ENCOUNTER — PATIENT OUTREACH (OUTPATIENT)
Dept: ONCOLOGY | Facility: CLINIC | Age: 62
End: 2022-07-12

## 2022-07-12 ENCOUNTER — TRANSCRIBE ORDERS (OUTPATIENT)
Dept: OTHER | Age: 62
End: 2022-07-12

## 2022-07-12 DIAGNOSIS — D05.10 INTRADUCTAL CARCINOMA: Primary | ICD-10-CM

## 2022-07-12 NOTE — PROGRESS NOTES
Writer reviewed referral to Alem Mckenzie. Appropriate for scheduling. Sent to New Patient Scheduling for completion.

## 2022-07-18 ENCOUNTER — TRANSFERRED RECORDS (OUTPATIENT)
Dept: HEALTH INFORMATION MANAGEMENT | Facility: CLINIC | Age: 62
End: 2022-07-18

## 2022-07-26 ENCOUNTER — ONCOLOGY VISIT (OUTPATIENT)
Dept: ONCOLOGY | Facility: CLINIC | Age: 62
End: 2022-07-26
Attending: INTERNAL MEDICINE
Payer: COMMERCIAL

## 2022-07-26 ENCOUNTER — MEDICAL CORRESPONDENCE (OUTPATIENT)
Dept: HEALTH INFORMATION MANAGEMENT | Facility: CLINIC | Age: 62
End: 2022-07-26

## 2022-07-26 VITALS
HEIGHT: 69 IN | DIASTOLIC BLOOD PRESSURE: 71 MMHG | SYSTOLIC BLOOD PRESSURE: 116 MMHG | TEMPERATURE: 98.5 F | RESPIRATION RATE: 16 BRPM | OXYGEN SATURATION: 96 % | WEIGHT: 182.8 LBS | BODY MASS INDEX: 27.08 KG/M2 | HEART RATE: 77 BPM

## 2022-07-26 DIAGNOSIS — Z87.42 HISTORY OF ATYPICAL HYPERPLASIA OF BREAST: Primary | ICD-10-CM

## 2022-07-26 DIAGNOSIS — Z80.3 FAMILY HISTORY OF MALIGNANT NEOPLASM OF BREAST: ICD-10-CM

## 2022-07-26 PROBLEM — Z86.0100 HISTORY OF COLONIC POLYPS: Status: ACTIVE | Noted: 2021-08-16

## 2022-07-26 PROCEDURE — 99205 OFFICE O/P NEW HI 60 MIN: CPT | Performed by: CLINICAL NURSE SPECIALIST

## 2022-07-26 PROCEDURE — 99417 PROLNG OP E/M EACH 15 MIN: CPT | Performed by: CLINICAL NURSE SPECIALIST

## 2022-07-26 PROCEDURE — G0463 HOSPITAL OUTPT CLINIC VISIT: HCPCS

## 2022-07-26 RX ORDER — MULTIVITAMIN WITH IRON
1 TABLET ORAL DAILY
COMMUNITY

## 2022-07-26 RX ORDER — TITANIUM DIOXIDE, OCTINOXATE, ZINC OXIDE 4.61; 1.6; .78 G/40ML; G/40ML; G/40ML
CREAM TOPICAL
COMMUNITY
End: 2023-08-17

## 2022-07-26 RX ORDER — POTASSIUM CITRATE 10 MEQ/1
TABLET, EXTENDED RELEASE ORAL
COMMUNITY
Start: 2021-08-11 | End: 2024-06-18

## 2022-07-26 ASSESSMENT — PAIN SCALES - GENERAL: PAINLEVEL: NO PAIN (0)

## 2022-07-26 NOTE — NURSING NOTE
"Oncology Rooming Note    July 26, 2022 2:32 PM   Brianda Payton is a 62 year old female who presents for:    Chief Complaint   Patient presents with     Oncology Clinic Visit     [facility] RETURN - HISTORY OF LUMPECTOMY FOR INTRADUCTAL CARCINOMA      Initial Vitals: /71   Pulse 77   Temp 98.5  F (36.9  C)   Resp 16   Ht 1.753 m (5' 9\")   Wt 82.9 kg (182 lb 12.8 oz)   LMP 09/27/1997   SpO2 96%   BMI 26.99 kg/m   Estimated body mass index is 26.99 kg/m  as calculated from the following:    Height as of this encounter: 1.753 m (5' 9\").    Weight as of this encounter: 82.9 kg (182 lb 12.8 oz). Body surface area is 2.01 meters squared.  No Pain (0) Comment: Data Unavailable   Patient's last menstrual period was 09/27/1997.  Allergies reviewed: Yes  Medications reviewed: Yes    Medications: Medication refills not needed today.  Pharmacy name entered into [facility]:    [id number] - [place], [place] - [address] [place] [address] [id number] - [place], [place] - [address]    Clinical concerns: No new concerns. Magaly was notified.      Candelario Bolton LPN            "

## 2022-07-26 NOTE — PROGRESS NOTES
Oncology Risk Management Consultation:  Date on this visit: 2022    Brianda Payton  is referred by Maddison Rojas MD for an oncology risk management consultation. She requires high risk screening and surveillance to reduce her risk of cancer secondary to having a atypical ductal hyperplasia , for which she had an excisional biopsy in . She is considered to have a lifetime risk for breast cancer of up to 35%.     Primary Physician: Maddison Rojas MD    History Of Present Illness:  Ms. Payton is a very pleasant, healthy  62 year old female who presents with a family history of breast cancer.    Pertinent history:  History of breastfeeding about 15 months (2 children 6-7 months each)  S/P  hysterectomy  (age 36)  No hx of HRT  Breast density: almost entirely fat   she underwent bilateral breast reduction.  (Bright Reinoso MD, plastic surgeon)  Breast biopsies:   10/29/2007- STEREOTACTIC RIGHT BREAST CORE BIOPSY - PATHOLOGY RESULTS: Histologic evaluations of the biopsy specimens   shows features of fibrocystic changes with intraductal hyperplasia and  flat epithelial atypia. No evidence of malignancy or calcifications.   There were, however, calcifications present in the biopsy specimen.   The pathologist thinks that these probably could be lost during the   pathology processing. These findings were discussed with the patient   by myself on at 10/31/2007 including recommendations for surgical   consultation because of the presence of atypia.     2007- Excisional biopsy (lumpectomy, right breast): FINAL DIAGNOSIS:  Right breast, lumpectomy, showin.  Focal atypical ductal hyperplasia (11 mm from the superior surface).        2.  Multifocal flat epithelial atypia (closest area 1.1 mm from the inferior margin).        3.  Columnar cell change and multifocal columnar cell hyperplasia.        4.  Duct ectasia.        5.  No calcifications seen.        6.  No evidence of malignancy.         7.  Margins clear of flat epithelial atypia or atypical ductal hyperplasia    8/21/2008- Two left breast ultrasound guided core needle biopsies: 1. 2:00 position, 5 cm from nipple and 2. 5:00 position, 11 cm from nipple, posteriorly; both benign, no atypia or malignancy.     Skin biopsies:  2/12/2016- FINAL DIAGNOSIS:Skin, left frontal scalp (OSC [id number] obtained   02/12/2016:- Perifolliculitis with follicular plugging and fibrosis  5/19/2014- Skin upper inner aspect right upper arm, lesion, biopsy-  Hyperkeratotic skin, cannot rule out actinic keratosis     Genetic testing:  None to date    Recent screening history:  12/23/2019- Breast MRI, BiRads1  9/30/2020- Screening tomosynthesis mammogram, BiRads1    At this visit, she denies new fatigue, breast pain, asymmetry, lumps, masses, thickening, nipple discharge and skin changes in her breasts.      Past Medical/Surgical History:  Past Medical History:   Diagnosis Date     Atypical ductal hyperplasia of breast 10/29/2007     Benign thyroid cyst      Menopause 1997     Past Surgical History:   Procedure Laterality Date     HC EXCISION BREAST LESION, OPEN >=1  2007    Right breast     MAMMOPLASTY REDUCTION  2005     US ASPIRATION THYROID CYST       ZZC TOTAL ABDOM HYSTERECTOMY  1997    large fibroid uterus.  ovaries retained         Allergies:  Allergies as of 07/26/2022 - Reviewed 10/29/2018   Allergen Reaction Noted     Seasonal allergies Other (See Comments) 09/27/2011       Current Medications:  Current Outpatient Medications   Medication Sig Dispense Refill     ARMOUR THYROID 60 MG tablet TAKE 1 TABLET BY MOUTH DAILY FIRST THING IN THE MORNING ON AN EMPTY STOMACH, NO FOOD FOR AN HOUR 90 tablet 3     aspirin 81 MG tablet Take 1 tablet (81 mg) by mouth daily INDICATION: FOR HEART AND CARDIOVASCULAR HEALTH 100 tablet 3     cholecalciferol (VITAMIN D3) 91198 UNITS capsule TAKE ON THE 1ST, 10TH AND 20TH, WITH A FAT CONTAINING MEAL 40 capsule PRN     Kylah  (GUGULIPID) 500 MG CAPS        magnesium 250 MG tablet Take 1 tablet by mouth daily       potassium citrate (UROCIT-K) 10 MEQ (1080 MG) CR tablet        Calcium Lactate 500 MG CAPS THREE TIMES A DAY (Patient not taking: Reported on 2022) 30 capsule      Cyanocobalamin (VITAMIN B 12 PO) Take 50 mcg by mouth (Patient not taking: Reported on 2022)          Family History:  Family History   Problem Relation Age of Onset     Heart Disease Mother      C.A.D. Mother      Hypertension Mother      Circulatory Mother      Lipids Mother      Circulatory Father      Thyroid Cancer Father 60     Depression Father      Melanoma Sister      Thyroid Cancer Sister 58     Skin Cancer Sister      Basal cell carcinoma Sister      Other - See Comments Sister         3 cavernous brain bleeds     Brain Tumor Sister 52        glioblastoma,  at 54     C.A.D. Maternal Grandmother      Hypertension Maternal Grandmother      Arthritis Maternal Grandmother      Heart Disease Maternal Grandmother         cause of death     Lipids Maternal Grandfather      Muscular Dystrophy Maternal Grandfather          in 50s     Heart Disease Maternal Grandfather          of myocardial infarction     Breast Cancer Paternal Grandmother 85        cause of death     Genitourinary Problems Paternal Grandmother      Gynecology Paternal Grandmother          around 95     Osteoporosis Paternal Grandmother      C.A.D. Paternal Grandfather      Prostate Cancer Paternal Grandfather 80         at 93     Alzheimer Disease Paternal Grandfather      Asthma Paternal Grandfather      Heart Disease Paternal Grandfather      Diabetes Paternal Great-Grandmother        Social History:  Social History     Socioeconomic History     Marital status:      Spouse name: Frankie Key     Number of children: 2     Years of education: Not on file     Highest education level: Not on file   Occupational History     Employer: SELF EMPLOYED.   Tobacco  "Use     Smoking status: Never Smoker     Smokeless tobacco: Never Used     Tobacco comment: never smoked   Substance and Sexual Activity     Alcohol use: Yes     Comment: socially     Drug use: No     Sexual activity: Yes   Other Topics Concern     Parent/sibling w/ CABG, MI or angioplasty before 65F 55M? Not Asked   Social History Narrative     Not on file     Social Determinants of Health     Financial Resource Strain: Not on file   Food Insecurity: Not on file   Transportation Needs: Not on file   Physical Activity: Not on file   Stress: Not on file   Social Connections: Not on file   Intimate Partner Violence: Not on file   Housing Stability: Not on file         Physical Exam:  /71   Pulse 77   Temp 98.5  F (36.9  C)   Resp 16   Ht 1.753 m (5' 9\")   Wt 82.9 kg (182 lb 12.8 oz)   LMP 09/27/1997   SpO2 96%   BMI 26.99 kg/m      GENERAL APPEARANCE: healthy, alert and no distress     NECK: likely thyroid nodules, mildly enlarged neck bilaterally     LYMPHATICS: No cervical, supraclavicular or axillary lymphadenopathy     RESP: lungs clear to auscultation - no rales, rhonchi or wheezes     BREAST: A multipositional, bilateral breast exam was performed.  Fairly symmetrical -Lsl>R. Right breast: no palpable dominant masses, no nipple discharge, no skin changes. Dense tissue.  Right axilla: no palpable adenopathy. Left breast: no palpable dominant masses, no nipple discharge, no skin changes. Left axilla: no palpable adenopathy. Dense tissue.   CARDIOVASCULAR: regular rate and rhythm, normal S1 S2, no S3 or S4 and no murmur.        SKIN: no suspicious lesions or rashes    Laboratory/Imaging Studies  No results found for any visits on 07/26/22.    ASSESSMENT  We reviewed her recent history and discussed her family history. She does not have very much breast cancer in her family - only her maternal grandmother with breast cancer in her 80s. Her sister is currently having genetic testing at [place] for her " issues with cavernous bleeding and that information may be helpful for Brianda.     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  She is a retired .  She is traveling to [place], staying several weeks at a time, as she is involved with caretaking of her parents who are being treated at [facility] for heart disease and other problems. She will look for a primary care provider in her new home, but she is also amenable to returning to [facility] for imaging, likely in December.      We discussed signs and symptoms to be watchful for in between visits.  She verbalized understanding of signs and symptoms to address with her health care providers in between visits, including lumps, thickening, swelling, tenderness, nipple discharge or changes in skin of breasts.    We also discussed that a breast MRI is definitely recommended for her, considering her risk for breast cancer is still high, due to the atypia.  The breast MRI in high-risk women has a higher sensitivity than mammography, and the combination of mammography and MRI in this population has the highest sensitivity. In a high-risk population, MRI and mammography combined have a higher sensitivity (92.7%) than US and mammography combined (52%) (Source: Shmuel NERI, Poncho Z, Jes D, et al. Detection of breast cancer with addition of annual screening ultrasound or a single screening MRI to mammography in women with elevated breast cancer risk. BERNA.2012;307(13):4428-2681. ) Therefore, in high-risk women for whom supplemental screening is indicated, MRI is recommended when possible. Screening high-risk women with breast MRI is cost-effective, and the cost-effectiveness of screening MRI increases with increasing breast cancer risk ( Vianey et. al 2006 and Woody et al. 2009). The National Comprehensive Cancer Society, American Cancer Society and American College of  Radiologists recommend breast-screening MRI in high-risk women.    In her case, I have ordered a breast MRI (IMG 1045) using the diagnostic codes:  Breast Screening, high risk patient (Z12.39)  Family history of breast cancer (Z80.3)  History of atypical ductal hyperplasia (Z87.898)    She also may benefit from  following  a healthy lifestyle plan recommended by both NCCN and the American Cancer Society that can reduce the risk of breast cancer:  1 Limit alcohol consumption to less than 1 drink per day (1 drink=5 oz.wine, 12 oz. Beer or 1.5 oz. 80-proof liquor).  2. Exercise per American Cancer Society guidelines of at least 150 minutes of moderate-intensity activity or 75 minutes of vigorous activity each week. (Or a combination of both.) Exercise should be spread  out over the week. Regular recreational exercise has been shown to reduce the risk of cancer by 30%.   3. Maintain a healthy weight with a Body Mass Index between 19-24.9. A postmenopausal BMI of 30.7 has been shown to have increased the risk for breast cancer by 37% in some studies.  4. Do not use tobacco products and limit exposure to passive smoke.  5. Breastfeed if possible. Research shows that 16+ months of breastfeeding, over a lifetime, can reduce a woman's risk of breast cancer by up to 27%.      We discussed other prevention websites including the American [place] [employer] Research (aicr.org) and the Environmental Working Group (ewg.org.)    Individualized Surveillance Plan for women  With 20% or greater lifetime risk of breast cancer   Per NCCN Breast Cancer Screening and Diagnosis Guidelines Version 1.2021   Recommended screening Test or procedure Last done Next Scheduled    Clinical encounter Clinical exam every 6-12 months.   Refer to genetic counseling if not already done.  Consider risk reduction strategies.   7/26/2022 December 2022   However, some family histories with breast cancers at a very young age, may warrant screening  starting earlier.    *May begin at age 40 if breast cancers in the family occur at later ages.    Annual mammogram beginning 10 years younger than the earliest breast cancer in the family but not prior to age 30.    Recommend annual breast MRI to begin 10 years younger than the earliest breast cancer in the family but not prior to age 25.    Breast MRIs are preferably done on day 7-15 of the menstrual cycle in premenopausal women.     See below     See below   Breast screening for patients at high risk due to thoracic radiation between the ages of 10-30   Annual clinical exam beginning 8 years after radiation therapy.    Annual screening mammogram beginning at age 30 or 8 years after radiation therapy    Annual breast MRI, beginning at age 25 or 8 years after radiation therapy.       See below     See below   Women who have a lifetime risk of >20% based on history of LCIS or ADH/ALH Annual screening mammogram beginning at age of LCIS or ADH/ALH but not prior to age 30.    Consider annual MRI to begin at age of diagnosis of LCIS or ADH/ALH but not prior to age 25.    Consider risk reducing strategies. 12/23/2019- Breast MRI, BiRads1    9/30/2020- Screening tomosynthesis mammogram, BiRads1 Breast MRI soon    Next exam: December 2022 followed by mammogram    Recommend risk reducing strategies for women with 1.7% 5 year risk of breast cancer. Declined      I spent a total of 90 minutes on the day of the visit. Please see the note for further information on patient assessment and treatment.    ALOK Kaiser-CNS, OCN, AGN-BC  Clinical Nurse Specialist  Cancer Risk Management Program  [facility]      CC: Maddison Rojas MD

## 2022-07-26 NOTE — LETTER
2022         RE: Brianda Payton  [address] [place] [address]        Dear Colleague,    Thank you for referring your patient, Brianda Payton, to the [facility]. Please see a copy of my visit note below.    Oncology Risk Management Consultation:  Date on this visit: 2022    Brianda Payton  is referred by Maddison Rojas MD for an oncology risk management consultation. She requires high risk screening and surveillance to reduce her risk of cancer secondary to having a atypical ductal hyperplasia , for which she had an excisional biopsy in . She is considered to have a lifetime risk for breast cancer of up to 35%.     Primary Physician: Maddison Rojas MD    History Of Present Illness:  Ms. Payton is a very pleasant, healthy  62 year old female who presents with a family history of breast cancer.    Pertinent history:  History of breastfeeding about 15 months (2 children 6-7 months each)  S/P  hysterectomy  (age 36)  No hx of HRT  Breast density: almost entirely fat   she underwent bilateral breast reduction.  (Bright Reinoso MD, plastic surgeon)  Breast biopsies:   10/29/2007- STEREOTACTIC RIGHT BREAST CORE BIOPSY - PATHOLOGY RESULTS: Histologic evaluations of the biopsy specimens   shows features of fibrocystic changes with intraductal hyperplasia and  flat epithelial atypia. No evidence of malignancy or calcifications.   There were, however, calcifications present in the biopsy specimen.   The pathologist thinks that these probably could be lost during the   pathology processing. These findings were discussed with the patient   by myself on at 10/31/2007 including recommendations for surgical   consultation because of the presence of atypia.     2007- Excisional biopsy (lumpectomy, right breast): FINAL DIAGNOSIS:  Right breast, lumpectomy, showin.  Focal atypical ductal hyperplasia (11 mm from the superior surface).        2.   Multifocal flat epithelial atypia (closest area 1.1 mm from the inferior margin).        3.  Columnar cell change and multifocal columnar cell hyperplasia.        4.  Duct ectasia.        5.  No calcifications seen.        6.  No evidence of malignancy.        7.  Margins clear of flat epithelial atypia or atypical ductal hyperplasia    8/21/2008- Two left breast ultrasound guided core needle biopsies: 1. 2:00 position, 5 cm from nipple and 2. 5:00 position, 11 cm from nipple, posteriorly; both benign, no atypia or malignancy.     Skin biopsies:  2/12/2016- FINAL DIAGNOSIS:Skin, left frontal scalp (OSC [id number] obtained   02/12/2016:- Perifolliculitis with follicular plugging and fibrosis  5/19/2014- Skin upper inner aspect right upper arm, lesion, biopsy-  Hyperkeratotic skin, cannot rule out actinic keratosis     Genetic testing:  None to date    Recent screening history:  12/23/2019- Breast MRI, BiRads1  9/30/2020- Screening tomosynthesis mammogram, BiRads1    At this visit, she denies new fatigue, breast pain, asymmetry, lumps, masses, thickening, nipple discharge and skin changes in her breasts.      Past Medical/Surgical History:  Past Medical History:   Diagnosis Date     Atypical ductal hyperplasia of breast 10/29/2007     Benign thyroid cyst      Menopause 1997     Past Surgical History:   Procedure Laterality Date     HC EXCISION BREAST LESION, OPEN >=1  2007    Right breast     MAMMOPLASTY REDUCTION  2005     US ASPIRATION THYROID CYST       ZZC TOTAL ABDOM HYSTERECTOMY  1997    large fibroid uterus.  ovaries retained         Allergies:  Allergies as of 07/26/2022 - Reviewed 10/29/2018   Allergen Reaction Noted     Seasonal allergies Other (See Comments) 09/27/2011       Current Medications:  Current Outpatient Medications   Medication Sig Dispense Refill     TERESE THYROID 60 MG tablet TAKE 1 TABLET BY MOUTH DAILY FIRST THING IN THE MORNING ON AN EMPTY STOMACH, NO FOOD FOR AN HOUR 90 tablet 3      aspirin 81 MG tablet Take 1 tablet (81 mg) by mouth daily INDICATION: FOR HEART AND CARDIOVASCULAR HEALTH 100 tablet 3     cholecalciferol (VITAMIN D3) 36544 UNITS capsule TAKE ON THE ,  AND , WITH A FAT CONTAINING MEAL 40 capsule PRN     Guggulipid (GUGULIPID) 500 MG CAPS        magnesium 250 MG tablet Take 1 tablet by mouth daily       potassium citrate (UROCIT-K) 10 MEQ (1080 MG) CR tablet        Calcium Lactate 500 MG CAPS THREE TIMES A DAY (Patient not taking: Reported on 2022) 30 capsule      Cyanocobalamin (VITAMIN B 12 PO) Take 50 mcg by mouth (Patient not taking: Reported on 2022)          Family History:  Family History   Problem Relation Age of Onset     Heart Disease Mother      C.A.D. Mother      Hypertension Mother      Circulatory Mother      Lipids Mother      Circulatory Father      Thyroid Cancer Father 60     Depression Father      Melanoma Sister      Thyroid Cancer Sister 58     Skin Cancer Sister      Basal cell carcinoma Sister      Other - See Comments Sister         3 cavernous brain bleeds     Brain Tumor Sister 52        glioblastoma,  at 54     C.A.D. Maternal Grandmother      Hypertension Maternal Grandmother      Arthritis Maternal Grandmother      Heart Disease Maternal Grandmother         cause of death     Lipids Maternal Grandfather      Muscular Dystrophy Maternal Grandfather          in 50s     Heart Disease Maternal Grandfather          of myocardial infarction     Breast Cancer Paternal Grandmother 85        cause of death     Genitourinary Problems Paternal Grandmother      Gynecology Paternal Grandmother          around 95     Osteoporosis Paternal Grandmother      C.A.D. Paternal Grandfather      Prostate Cancer Paternal Grandfather 80         at 93     Alzheimer Disease Paternal Grandfather      Asthma Paternal Grandfather      Heart Disease Paternal Grandfather      Diabetes Paternal Great-Grandmother        Social History:  Social  "History     Socioeconomic History     Marital status:      Spouse name: Frankie Key     Number of children: 2     Years of education: Not on file     Highest education level: Not on file   Occupational History     Employer: SELF EMPLOYED.   Tobacco Use     Smoking status: Never Smoker     Smokeless tobacco: Never Used     Tobacco comment: never smoked   Substance and Sexual Activity     Alcohol use: Yes     Comment: socially     Drug use: No     Sexual activity: Yes   Other Topics Concern     Parent/sibling w/ CABG, MI or angioplasty before 65F 55M? Not Asked   Social History Narrative     Not on file     Social Determinants of Health     Financial Resource Strain: Not on file   Food Insecurity: Not on file   Transportation Needs: Not on file   Physical Activity: Not on file   Stress: Not on file   Social Connections: Not on file   Intimate Partner Violence: Not on file   Housing Stability: Not on file         Physical Exam:  /71   Pulse 77   Temp 98.5  F (36.9  C)   Resp 16   Ht 1.753 m (5' 9\")   Wt 82.9 kg (182 lb 12.8 oz)   LMP 09/27/1997   SpO2 96%   BMI 26.99 kg/m      GENERAL APPEARANCE: healthy, alert and no distress     NECK: likely thyroid nodules, mildly enlarged neck bilaterally     LYMPHATICS: No cervical, supraclavicular or axillary lymphadenopathy     RESP: lungs clear to auscultation - no rales, rhonchi or wheezes     BREAST: A multipositional, bilateral breast exam was performed.  Fairly symmetrical -Lsl>R. Right breast: no palpable dominant masses, no nipple discharge, no skin changes. Dense tissue.  Right axilla: no palpable adenopathy. Left breast: no palpable dominant masses, no nipple discharge, no skin changes. Left axilla: no palpable adenopathy. Dense tissue.   CARDIOVASCULAR: regular rate and rhythm, normal S1 S2, no S3 or S4 and no murmur.        SKIN: no suspicious lesions or rashes    Laboratory/Imaging Studies  No results found for any visits on " 07/26/22.    ASSESSMENT  We reviewed her recent history and discussed her family history. She does not have very much breast cancer in her family - only her maternal grandmother with breast cancer in her 80s. Her sister is currently having genetic testing at [facility] for her issues with cavernous bleeding and that information may be helpful for Brianda.     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  She is a retired .  She is traveling to [place], staying several weeks at a time, as she is involved with caretaking of her parents who are being treated at [facility] for heart disease and other problems. She will look for a primary care provider in her new home, but she is also amenable to returning to [facility] for imaging, likely in December.      We discussed signs and symptoms to be watchful for in between visits.  She verbalized understanding of signs and symptoms to address with her health care providers in between visits, including lumps, thickening, swelling, tenderness, nipple discharge or changes in skin of breasts.    We also discussed that a breast MRI is definitely recommended for her, considering her risk for breast cancer is still high, due to the atypia.  The breast MRI in high-risk women has a higher sensitivity than mammography, and the combination of mammography and MRI in this population has the highest sensitivity. In a high-risk population, MRI and mammography combined have a higher sensitivity (92.7%) than US and mammography combined (52%) (Source: Shmuel NERI, Poncho Z, Jes D, et al. Detection of breast cancer with addition of annual screening ultrasound or a single screening MRI to mammography in women with elevated breast cancer risk. BERNA.2012;307(13):9203-8397. ) Therefore, in high-risk women for whom supplemental screening is indicated, MRI is recommended when possible. Screening high-risk  women with breast MRI is cost-effective, and the cost-effectiveness of screening MRI increases with increasing breast cancer risk ( Vianey et. al 2006 and Woody et al. 2009). The National Comprehensive Cancer Society, American Cancer Society and American College of Radiologists recommend breast-screening MRI in high-risk women.    In her case, I have ordered a breast MRI (IMG 1045) using the diagnostic codes:  Breast Screening, high risk patient (Z12.39)  Family history of breast cancer (Z80.3)  History of atypical ductal hyperplasia (Z87.898)    She also may benefit from  following  a healthy lifestyle plan recommended by both NCCN and the American Cancer Society that can reduce the risk of breast cancer:  1 Limit alcohol consumption to less than 1 drink per day (1 drink=5 oz.wine, 12 oz. Beer or 1.5 oz. 80-proof liquor).  2. Exercise per American Cancer Society guidelines of at least 150 minutes of moderate-intensity activity or 75 minutes of vigorous activity each week. (Or a combination of both.) Exercise should be spread  out over the week. Regular recreational exercise has been shown to reduce the risk of cancer by 30%.   3. Maintain a healthy weight with a Body Mass Index between 19-24.9. A postmenopausal BMI of 30.7 has been shown to have increased the risk for breast cancer by 37% in some studies.  4. Do not use tobacco products and limit exposure to passive smoke.  5. Breastfeed if possible. Research shows that 16+ months of breastfeeding, over a lifetime, can reduce a woman's risk of breast cancer by up to 27%.      We discussed other prevention websites including the American [place] for Cancer Research (aicr.org) and the Environmental Working Group (ewg.org.)    Individualized Surveillance Plan for women  With 20% or greater lifetime risk of breast cancer   Per NCCN Breast Cancer Screening and Diagnosis Guidelines Version 1.2021   Recommended screening Test or procedure Last done Next Scheduled     Clinical encounter Clinical exam every 6-12 months.   Refer to genetic counseling if not already done.  Consider risk reduction strategies.   7/26/2022 December 2022   However, some family histories with breast cancers at a very young age, may warrant screening starting earlier.    *May begin at age 40 if breast cancers in the family occur at later ages.    Annual mammogram beginning 10 years younger than the earliest breast cancer in the family but not prior to age 30.    Recommend annual breast MRI to begin 10 years younger than the earliest breast cancer in the family but not prior to age 25.    Breast MRIs are preferably done on day 7-15 of the menstrual cycle in premenopausal women.     See below     See below   Breast screening for patients at high risk due to thoracic radiation between the ages of 10-30   Annual clinical exam beginning 8 years after radiation therapy.    Annual screening mammogram beginning at age 30 or 8 years after radiation therapy    Annual breast MRI, beginning at age 25 or 8 years after radiation therapy.       See below     See below   Women who have a lifetime risk of >20% based on history of LCIS or ADH/ALH Annual screening mammogram beginning at age of LCIS or ADH/ALH but not prior to age 30.    Consider annual MRI to begin at age of diagnosis of LCIS or ADH/ALH but not prior to age 25.    Consider risk reducing strategies. 12/23/2019- Breast MRI, BiRads1    9/30/2020- Screening tomosynthesis mammogram, BiRads1 Breast MRI soon    Next exam: December 2022 followed by mammogram    Recommend risk reducing strategies for women with 1.7% 5 year risk of breast cancer. Declined      I spent a total of 90 minutes on the day of the visit. Please see the note for further information on patient assessment and treatment.          Again, thank you for allowing me to participate in the care of your patient.      Sincerely,    ALOK Kaiser CNS

## 2022-07-26 NOTE — PATIENT INSTRUCTIONS
Individualized Surveillance Plan for women  With 20% or greater lifetime risk of breast cancer   Per NCCN Breast Cancer Screening and Diagnosis Guidelines Version 1.2021   Recommended screening Test or procedure Last done Next Scheduled    Clinical encounter Clinical exam every 6-12 months.   Refer to genetic counseling if not already done.  Consider risk reduction strategies.   7/26/2022 December 2022   However, some family histories with breast cancers at a very young age, may warrant screening starting earlier.    *May begin at age 40 if breast cancers in the family occur at later ages.    Annual mammogram beginning 10 years younger than the earliest breast cancer in the family but not prior to age 30.    Recommend annual breast MRI to begin 10 years younger than the earliest breast cancer in the family but not prior to age 25.    Breast MRIs are preferably done on day 7-15 of the menstrual cycle in premenopausal women.     See below     See below   Breast screening for patients at high risk due to thoracic radiation between the ages of 10-30   Annual clinical exam beginning 8 years after radiation therapy.    Annual screening mammogram beginning at age 30 or 8 years after radiation therapy    Annual breast MRI, beginning at age 25 or 8 years after radiation therapy.       See below     See below   Women who have a lifetime risk of >20% based on history of LCIS or ADH/ALH Annual screening mammogram beginning at age of LCIS or ADH/ALH but not prior to age 30.    Consider annual MRI to begin at age of diagnosis of LCIS or ADH/ALH but not prior to age 25.    Consider risk reducing strategies. 12/23/2019- Breast MRI, BiRads1    9/30/2020- Screening tomosynthesis mammogram, BiRads1 Breast MRI soon    Next exam: December 2022 or January 2023 followed by mammogram    Recommend risk reducing strategies for women with 1.7% 5 year risk of breast cancer. Declined

## 2022-07-27 DIAGNOSIS — Z78.9 STATIN INTOLERANCE: ICD-10-CM

## 2022-07-27 DIAGNOSIS — Z82.49 FAMILY HISTORY OF ISCHEMIC HEART DISEASE (IHD): Primary | ICD-10-CM

## 2022-07-27 DIAGNOSIS — E78.5 HYPERLIPEMIA: ICD-10-CM

## 2022-08-04 ENCOUNTER — ANCILLARY PROCEDURE (OUTPATIENT)
Dept: ULTRASOUND IMAGING | Facility: CLINIC | Age: 62
End: 2022-08-04
Attending: INTERNAL MEDICINE
Payer: COMMERCIAL

## 2022-08-04 DIAGNOSIS — E04.1 NONTOXIC SINGLE THYROID NODULE: ICD-10-CM

## 2022-08-04 PROCEDURE — 76536 US EXAM OF HEAD AND NECK: CPT

## 2022-08-09 ENCOUNTER — TRANSFERRED RECORDS (OUTPATIENT)
Dept: HEALTH INFORMATION MANAGEMENT | Facility: CLINIC | Age: 62
End: 2022-08-09

## 2022-08-12 ENCOUNTER — ANCILLARY PROCEDURE (OUTPATIENT)
Dept: MRI IMAGING | Facility: CLINIC | Age: 62
End: 2022-08-12
Attending: CLINICAL NURSE SPECIALIST
Payer: COMMERCIAL

## 2022-08-12 DIAGNOSIS — Z87.42 HISTORY OF ATYPICAL HYPERPLASIA OF BREAST: ICD-10-CM

## 2022-08-12 DIAGNOSIS — Z80.3 FAMILY HISTORY OF MALIGNANT NEOPLASM OF BREAST: ICD-10-CM

## 2022-08-12 PROCEDURE — 77049 MRI BREAST C-+ W/CAD BI: CPT

## 2022-08-12 PROCEDURE — 255N000002 HC RX 255 OP 636: Performed by: CLINICAL NURSE SPECIALIST

## 2022-08-12 PROCEDURE — A9585 GADOBUTROL INJECTION: HCPCS | Performed by: CLINICAL NURSE SPECIALIST

## 2022-08-12 RX ORDER — GADOBUTROL 604.72 MG/ML
9 INJECTION INTRAVENOUS ONCE
Status: COMPLETED | OUTPATIENT
Start: 2022-08-12 | End: 2022-08-12

## 2022-08-12 RX ADMIN — GADOBUTROL 9 ML: 604.72 INJECTION INTRAVENOUS at 12:14

## 2022-10-09 ENCOUNTER — HEALTH MAINTENANCE LETTER (OUTPATIENT)
Age: 62
End: 2022-10-09

## 2023-02-12 ENCOUNTER — HEALTH MAINTENANCE LETTER (OUTPATIENT)
Age: 63
End: 2023-02-12

## 2023-03-25 ENCOUNTER — HEALTH MAINTENANCE LETTER (OUTPATIENT)
Age: 63
End: 2023-03-25

## 2023-05-21 ENCOUNTER — HEALTH MAINTENANCE LETTER (OUTPATIENT)
Age: 63
End: 2023-05-21

## 2023-07-11 ENCOUNTER — TELEPHONE (OUTPATIENT)
Dept: FAMILY MEDICINE | Facility: CLINIC | Age: 63
End: 2023-07-11

## 2023-07-11 ENCOUNTER — ONCOLOGY VISIT (OUTPATIENT)
Dept: ONCOLOGY | Facility: CLINIC | Age: 63
End: 2023-07-11
Attending: CLINICAL NURSE SPECIALIST
Payer: COMMERCIAL

## 2023-07-11 VITALS
TEMPERATURE: 98.2 F | HEART RATE: 59 BPM | BODY MASS INDEX: 27.9 KG/M2 | DIASTOLIC BLOOD PRESSURE: 79 MMHG | WEIGHT: 188.9 LBS | OXYGEN SATURATION: 96 % | SYSTOLIC BLOOD PRESSURE: 133 MMHG

## 2023-07-11 DIAGNOSIS — Z12.39 BREAST CANCER SCREENING, HIGH RISK PATIENT: Primary | ICD-10-CM

## 2023-07-11 DIAGNOSIS — Z87.42 HISTORY OF ATYPICAL HYPERPLASIA OF BREAST: ICD-10-CM

## 2023-07-11 DIAGNOSIS — T63.444A BEE STING REACTION, UNDETERMINED INTENT, INITIAL ENCOUNTER: ICD-10-CM

## 2023-07-11 DIAGNOSIS — Z80.3 FAMILY HISTORY OF MALIGNANT NEOPLASM OF BREAST: ICD-10-CM

## 2023-07-11 PROBLEM — N95.2 VAGINAL ATROPHY: Status: ACTIVE | Noted: 2023-07-11

## 2023-07-11 PROCEDURE — 99213 OFFICE O/P EST LOW 20 MIN: CPT | Performed by: CLINICAL NURSE SPECIALIST

## 2023-07-11 PROCEDURE — G0463 HOSPITAL OUTPT CLINIC VISIT: HCPCS | Performed by: CLINICAL NURSE SPECIALIST

## 2023-07-11 RX ORDER — COVID-19 ANTIGEN TEST
KIT MISCELLANEOUS
COMMUNITY
Start: 2023-02-10 | End: 2023-08-17

## 2023-07-11 RX ORDER — ASPIRIN 81 MG/1
1 TABLET, COATED ORAL
COMMUNITY
Start: 2022-11-16 | End: 2023-08-17

## 2023-07-11 RX ORDER — THYROID 30 MG/1
1 TABLET ORAL
COMMUNITY
Start: 2023-04-25 | End: 2023-08-17

## 2023-07-11 RX ORDER — BIOTIN 1 MG
8 TABLET ORAL
COMMUNITY
Start: 2022-08-30 | End: 2024-06-18

## 2023-07-11 RX ORDER — EPINEPHRINE 0.3 MG/.3ML
0.3 INJECTION SUBCUTANEOUS PRN
Qty: 2 EACH | Refills: 0 | Status: SHIPPED | OUTPATIENT
Start: 2023-07-11 | End: 2023-08-17

## 2023-07-11 ASSESSMENT — PAIN SCALES - GENERAL: PAINLEVEL: NO PAIN (0)

## 2023-07-11 NOTE — TELEPHONE ENCOUNTER
Patient states that her oncologist Alem Mckenzie recommended establishing care with Dr. Grijalva. Writer advised patient that Dr. Grijalva is not taking new patients and offered patient an establish care visit with another provider at the [facility].     Patient states that Alem Mckenzie indicated that she might speak to Dr. Grijalva regarding her case. Writer reiterated Dr. Grijalva's statement that another provider could see her sooner, patient stated that she was supposed to establish care with Dr. Grijalva per her onocologist.     Patient requested writer check with Dr. Grijalva to see if onocolgist spoke directly to Dr. Grijalva about taking her on as a patient, and if not, who (a female provider) she would recommend instead.     Callback: [phone] ok to leave a detailed vm    Rocio Bates RN  -[facility]

## 2023-07-11 NOTE — PATIENT INSTRUCTIONS
Individualized Surveillance Plan for women  With 20% or greater lifetime risk of breast cancer   Per NCCN Breast Cancer Screening and Diagnosis Guidelines Version 1.2023   Recommended screening Test or procedure Last done Next Scheduled    Clinical encounter Clinical exam every 6-12 months.   Refer to genetic counseling if not already done.  Consider risk reduction strategies.   7/11/2023 July 2024   However, some family histories with breast cancers at a very young age, may warrant screening starting earlier.    *May begin at age 40 if breast cancers in the family occur at later ages.    Annual mammogram beginning 10 years younger than the earliest breast cancer in the family but not prior to age 30.    Recommend annual breast MRI to begin 10 years younger than the earliest breast cancer in the family but not prior to age 25.    Breast MRIs are preferably done on day 7-15 of the menstrual cycle in premenopausal women.     See below       See below   Breast screening for patients at high risk due to thoracic radiation between the ages of 10-30   Annual clinical exam beginning 8 years after radiation therapy.    Annual screening mammogram beginning at age 30 or 8 years after radiation therapy    Annual breast MRI, beginning at age 25 or 8 years after radiation therapy.       See below     See below   Women who have a lifetime risk of >20% based on history of LCIS or ADH/ALH Annual screening mammogram beginning at age of LCIS or ADH/ALH but not prior to age 30.    Consider annual MRI to begin at age of diagnosis of LCIS or ADH/ALH but not prior to age 25.    Recommend risk reducing strategies if possible. 8/12/2022- Breast MRI, Right Breast: BI-RADS CATEGORY: 2 - Benign Finding(s). Left Breast: BI-RADS CATEGORY: 1 -  NEGATIVE.  Mammogram after visit today    Breast MRI in January    Next exam: July 2024 followed by mammogram    Recommend risk reducing strategies for women with 1.7% 5 year risk of breast cancer.

## 2023-07-11 NOTE — NURSING NOTE
"Oncology Rooming Note    July 11, 2023 11:58 AM   Brianda Payton is a 63 year old female who presents for:    Chief Complaint   Patient presents with     Oncology Clinic Visit     Benign neoplasm of colon     Initial Vitals: /79 (BP Location: Right arm, Patient Position: Sitting, Cuff Size: Adult Regular)   Pulse 59   Temp 98.2  F (36.8  C) (Oral)   Wt 85.7 kg (188 lb 14.4 oz)   LMP 09/27/1997   SpO2 96%   BMI 27.90 kg/m   Estimated body mass index is 27.9 kg/m  as calculated from the following:    Height as of 7/26/22: 1.753 m (5' 9\").    Weight as of this encounter: 85.7 kg (188 lb 14.4 oz). Body surface area is 2.04 meters squared.  No Pain (0) Comment: Data Unavailable   Patient's last menstrual period was 09/27/1997.  Allergies reviewed: Yes  Medications reviewed: Yes    Medications: MEDICATION REFILLS NEEDED TODAY. Provider was notified.  Pharmacy name entered into [facility]:    [id number] - [place], [place] - [address] [id number] - [place], [place] - [address]    Clinical concerns: needs refills for thyroid medication, preferably 90 days.    Recently had 2 incidents that required Epipen. Wonders if she can have one on hand.       Magdaleno Jj            "

## 2023-07-11 NOTE — TELEPHONE ENCOUNTER
Patient Contact    Attempt # 1    Was call answered?  Yes. Writer relayed PCP's message below, patient expressed verbal understanding.    Patient leaving [place] on 09/01/23 and requesting appt before that time, writer made appt:  8/17/2023 8:30 AM (Arrive by 8:10 AM) Joseph Grijalva MD [facility]     Rocio Bates RN  [facility]

## 2023-07-11 NOTE — LETTER
2023         RE: Brianda Payton  [address] [place] [facility] [address]        Dear Colleague,    Thank you for referring your patient, Brianda Payton, to the [facility]. Please see a copy of my visit note below.    Oncology Risk Management Consultation:  Date on this visit: 2023    Brianda Payton requires high risk screening and surveillance to reduce her risk of cancer secondary to  atypical ductal hyperplasia,  for which she had an excisional biopsy in . She is considered to have a lifetime risk for breast cancer of up to 35%.      Primary Physician: Joseph Grijalva MD     History Of Present Illness:  Ms. Payton is a very pleasant, healthy  63 year old female who presents with a history of atypical ductal hyperplasia.     Pertinent history:  History of breastfeeding about 15 months (2 children 6-7 months each)  S/P  hysterectomy  (age 36)  No hx of HRT  Breast density: almost entirely fat   she underwent bilateral breast reduction.  (Bright Reinoso MD, plastic surgeon)    Breast biopsies:   10/29/2007- STEREOTACTIC RIGHT BREAST CORE BIOPSY - PATHOLOGY RESULTS: Histologic evaluations of the biopsy specimens   shows features of fibrocystic changes with intraductal hyperplasia and  flat epithelial atypia. No evidence of malignancy or calcifications.   There were, however, calcifications present in the biopsy specimen. The pathologist thinks that these probably could be lost during the  pathology processing.      2007- Excisional biopsy (lumpectomy, right breast): FINAL DIAGNOSIS:  Right breast, lumpectomy, showin.  Focal atypical ductal hyperplasia (11 mm from the superior surface).        2.  Multifocal flat epithelial atypia (closest area 1.1 mm from the inferior margin).        3.  Columnar cell change and multifocal columnar cell hyperplasia.        4.  Duct ectasia.        5.  No calcifications seen.        6.  No evidence of malignancy.         7.  Margins clear of flat epithelial atypia or atypical ductal hyperplasia     8/21/2008- Two left breast ultrasound guided core needle biopsies: 1. 2:00 position, 5 cm from nipple and 2. 5:00 position, 11 cm from nipple, posteriorly; both benign, no atypia or malignancy.      Skin biopsies:  5/19/2014- Skin upper inner aspect right upper arm, lesion, biopsy-  Hyperkeratotic skin, cannot rule out actinic keratosis  2/12/2016- FINAL DIAGNOSIS:Skin, left frontal scalp (OSC [id number] obtained   02/12/2016:- Perifolliculitis with follicular plugging and fibrosis     Genetic testing:  None to date     Recent screening history:  12/23/2019- Breast MRI, BiRads1  9/30/2020- Screening tomosynthesis mammogram, BiRads1  8/12/2022- Breast MRI, Right Breast: BI-RADS CATEGORY: 2 - Benign Finding(s). Left Breast: BI-RADS CATEGORY: 1 -  NEGATIVE.      At this visit, she denies new fatigue, breast pain, asymmetry, lumps, masses, thickening, nipple discharge and skin changes in her breasts.     Past Medical/Surgical History:  Past Medical History:   Diagnosis Date    Atypical ductal hyperplasia of breast 10/29/2007    Benign thyroid cyst     Menopause 1997     Past Surgical History:   Procedure Laterality Date    HC EXCISION BREAST LESION, OPEN >=1  2007    Right breast    MAMMOPLASTY REDUCTION  2005    US ASPIRATION THYROID CYST      ZZC TOTAL ABDOM HYSTERECTOMY  1997    large fibroid uterus.  ovaries retained         Allergies:  Allergies as of 07/11/2023 - Reviewed 07/11/2023   Allergen Reaction Noted    Seasonal allergies Other (See Comments) 09/27/2011       Current Medications:  Current Outpatient Medications   Medication Sig Dispense Refill    biotin 1000 MCG TABS tablet Take 8 mg by mouth      EPINEPHrine (ANY BX GENERIC EQUIV) 0.3 MG/0.3ML injection 2-pack Inject 0.3 mLs (0.3 mg) into the muscle as needed for anaphylaxis May repeat one time in 5-15 minutes if response to initial dose is inadequate. 2 each 0    ARMOUR  THYROID 30 MG tablet Take 1 tablet by mouth daily at 2 pm      Holcomb THYROID 60 MG tablet TAKE 1 TABLET BY MOUTH DAILY FIRST THING IN THE MORNING ON AN EMPTY STOMACH, NO FOOD FOR AN HOUR 90 tablet 3    aspirin 81 MG tablet Take 1 tablet (81 mg) by mouth daily INDICATION: FOR HEART AND CARDIOVASCULAR HEALTH 100 tablet 3    ASPIRIN LOW DOSE 81 MG EC tablet Take 1 tablet by mouth 2 times daily      cholecalciferol (VITAMIN D3) 35554 UNITS capsule TAKE ON THE ,  AND , WITH A FAT CONTAINING MEAL 40 capsule PRN    FLOWFLEX COVID-19 AG HOME TEST KIT USE 'S DIRECTIONS ON PACKAGE      Guggulipid (GUGULIPID) 500 MG CAPS       magnesium 250 MG tablet Take 1 tablet by mouth daily      potassium citrate (UROCIT-K) 10 MEQ (1080 MG) CR tablet           Family History:  Family History   Problem Relation Age of Onset    Heart Disease Mother         9 stents    C.A.D. Mother     Hypertension Mother     Circulatory Mother     Lipids Mother     Circulatory Father     Thyroid Cancer Father 60    Depression Father     Melanoma Sister     Thyroid Cancer Sister 58    Skin Cancer Sister     Basal cell carcinoma Sister     Other - See Comments Sister         3 cavernous brain bleeds    Brain Tumor Sister 52        glioblastoma,  at 54    C.A.D. Maternal Grandmother     Hypertension Maternal Grandmother     Arthritis Maternal Grandmother     Heart Disease Maternal Grandmother         cause of death    Lipids Maternal Grandfather     Muscular Dystrophy Maternal Grandfather          in 50s    Heart Disease Maternal Grandfather          of myocardial infarction    Breast Cancer Paternal Grandmother 85        cause of death    Genitourinary Problems Paternal Grandmother     Gynecology Paternal Grandmother          around 95    Osteoporosis Paternal Grandmother     C.A.D. Paternal Grandfather     Prostate Cancer Paternal Grandfather 80         at 93    Alzheimer Disease Paternal Grandfather     Asthma  Paternal Grandfather     Heart Disease Paternal Grandfather     Diabetes Paternal Great-Grandmother        Social History:  Social History     Socioeconomic History    Marital status:      Spouse name: Frankie Key    Number of children: 2    Years of education: Not on file    Highest education level: Not on file   Occupational History     Employer: SELF EMPLOYED.   Tobacco Use    Smoking status: Never    Smokeless tobacco: Never    Tobacco comments:     never smoked   Substance and Sexual Activity    Alcohol use: Yes     Comment: socially    Drug use: No    Sexual activity: Yes   Other Topics Concern    Parent/sibling w/ CABG, MI or angioplasty before 65F 55M? Not Asked   Social History Narrative    Not on file     Social Determinants of Health     Financial Resource Strain: Not on file   Food Insecurity: Not on file   Transportation Needs: Not on file   Physical Activity: Not on file   Stress: Not on file   Social Connections: Not on file   Intimate Partner Violence: Not on file   Housing Stability: Not on file     Physical Exam:  /79 (BP Location: Right arm, Patient Position: Sitting, Cuff Size: Adult Regular)   Pulse 59   Temp 98.2  F (36.8  C) (Oral)   Wt 85.7 kg (188 lb 14.4 oz)   LMP 09/27/1997   SpO2 96%   BMI 27.90 kg/m      GENERAL APPEARANCE: healthy, alert and no distress     NECK: no adenopathy, no asymmetry or masses     LYMPHATICS: No cervical, supraclavicular, axillary or inguinal lymphadenopathy     RESP: lungs clear to auscultation - no rales, rhonchi or wheezes     CARDIOVASCULAR: regular rate and rhythm, normal S1 S2, no S3 or S4 and no murmur.   BREASTS: Examined in a sitting and lying position. L>R,  without visible distortion, swelling or rashes. No nipple inversion, nipple discharge, breast dimpling or puckering. Breast tissue is  dense to palpation. In the right breast there is prominent fibroglandular tissue that is ropey/lumpy.  Axillary area without masses or  lymphadenopathy.  SKIN: no suspicious lesions or rashes    Laboratory/Imaging Studies  No results found for any visits on 07/11/23.    ASSESSMENT  We reviewed her interval history; she has no concerns today but has not had any screening since last year.  She is still living part time in [place] and part time in [place].  I've placed an order for her to have a breast MRI in January if she is here, as she does go back and forth, but if she is not here, she should have that done in [place] in January.  She notes that she will look for a primary care provider there with whom to establish care. She notes that Dr. Rojas has left her practice to pursue music full time and she needs a provider here as well. We discussed her establishing care at [facility] with Dr. Jossy Grijalva.  She notes that three members of her family suffered hornet stings recently that sent them to the hospital and required paramedics dosing one of them twice with epipens.      She has not yet been tested but requests and epipen today.  I prescribed that for her and would like her to follow up with her new primary care provider as to how best to approach any allergy testing and handling the epipen in case of emergencies.    Her exam today is unremarkable and she will proceed as follows:    Individualized Surveillance Plan for women  With 20% or greater lifetime risk of breast cancer   Per NCCN Breast Cancer Screening and Diagnosis Guidelines Version 1.2023   Recommended screening Test or procedure Last done Next Scheduled    Clinical encounter Clinical exam every 6-12 months.   Refer to genetic counseling if not already done.  Consider risk reduction strategies.   7/11/2023 July 2024   However, some family histories with breast cancers at a very young age, may warrant screening starting earlier.    *May begin at age 40 if breast cancers in the family occur at later ages.    Annual mammogram beginning 10 years younger than the earliest  breast cancer in the family but not prior to age 30.    Recommend annual breast MRI to begin 10 years younger than the earliest breast cancer in the family but not prior to age 25.    Breast MRIs are preferably done on day 7-15 of the menstrual cycle in premenopausal women.     See below       See below   Breast screening for patients at high risk due to thoracic radiation between the ages of 10-30   Annual clinical exam beginning 8 years after radiation therapy.    Annual screening mammogram beginning at age 30 or 8 years after radiation therapy    Annual breast MRI, beginning at age 25 or 8 years after radiation therapy.       See below     See below   Women who have a lifetime risk of >20% based on history of LCIS or ADH/ALH Annual screening mammogram beginning at age of LCIS or ADH/ALH but not prior to age 30.    Consider annual MRI to begin at age of diagnosis of LCIS or ADH/ALH but not prior to age 25.    Recommend risk reducing strategies if possible. 8/12/2022- Breast MRI, Right Breast: BI-RADS CATEGORY: 2 - Benign Finding(s). Left Breast: BI-RADS CATEGORY: 1 -  NEGATIVE.  Mammogram after visit today    Breast MRI in January    Next exam: July 2024 followed by mammogram    Recommend risk reducing strategies for women with 1.7% 5 year risk of breast cancer.     I spent a total of 23 minutes on the day of the visit. Please see the note for further information on patient assessment and treatment.    ALOK Kaiser-CNS, OCN, AGN-BC  Clinical Nurse Specialist  Cancer Risk Management Program  [id number]    CC: Joseph Grijalva MD

## 2023-07-11 NOTE — TELEPHONE ENCOUNTER
Pt calling requesting to establish care with  and assistance with rescheduling mammogram [facility], appointment was cancelled today. Okay to order care coordinator? Discussed with pt; pt agrees.     Mammogram needs to be rescheduled due to pt having symptoms. This is the second time since Dec 2022 pt has had to reschedule; teary eyed during phone call.     Pt had an oncology visit with Dr. Mckenzie today 7/11/2023   and was instructed to establish care with a PCP. Dr. Rojas is no longer practicing at [facility] so pt is hoping to establish care with Dr. Grijalva.     Pt would like to be seen in first available, is able to make appointment for anytime. Lives in [place] [facility] [place]. In [place]-Sept.    Breast cancer  Cholesterol   Cardiac history in family

## 2023-07-11 NOTE — PROGRESS NOTES
Oncology Risk Management Consultation:  Date on this visit: 2023    Brianda Payton requires high risk screening and surveillance to reduce her risk of cancer secondary to  atypical ductal hyperplasia,  for which she had an excisional biopsy in . She is considered to have a lifetime risk for breast cancer of up to 35%.      Primary Physician: Joseph Grijalva MD     History Of Present Illness:  Ms. Payton is a very pleasant, healthy  63 year old female who presents with a history of atypical ductal hyperplasia.     Pertinent history:  History of breastfeeding about 15 months (2 children 6-7 months each)  S/P  hysterectomy  (age 36)  No hx of HRT  Breast density: almost entirely fat   she underwent bilateral breast reduction.  (Bright Reinoso MD, plastic surgeon)    Breast biopsies:   10/29/2007- STEREOTACTIC RIGHT BREAST CORE BIOPSY - PATHOLOGY RESULTS: Histologic evaluations of the biopsy specimens   shows features of fibrocystic changes with intraductal hyperplasia and  flat epithelial atypia. No evidence of malignancy or calcifications.   There were, however, calcifications present in the biopsy specimen. The pathologist thinks that these probably could be lost during the  pathology processing.      2007- Excisional biopsy (lumpectomy, right breast): FINAL DIAGNOSIS:  Right breast, lumpectomy, showin.  Focal atypical ductal hyperplasia (11 mm from the superior surface).        2.  Multifocal flat epithelial atypia (closest area 1.1 mm from the inferior margin).        3.  Columnar cell change and multifocal columnar cell hyperplasia.        4.  Duct ectasia.        5.  No calcifications seen.        6.  No evidence of malignancy.        7.  Margins clear of flat epithelial atypia or atypical ductal hyperplasia     2008- Two left breast ultrasound guided core needle biopsies: 1. 2:00 position, 5 cm from nipple and 2. 5:00 position, 11 cm from nipple, posteriorly; both benign, no  atypia or malignancy.      Skin biopsies:  5/19/2014- Skin upper inner aspect right upper arm, lesion, biopsy-  Hyperkeratotic skin, cannot rule out actinic keratosis  2/12/2016- FINAL DIAGNOSIS:Skin, left frontal scalp (OSC [id number] obtained   02/12/2016:- Perifolliculitis with follicular plugging and fibrosis     Genetic testing:  None to date     Recent screening history:  12/23/2019- Breast MRI, BiRads1  9/30/2020- Screening tomosynthesis mammogram, BiRads1  8/12/2022- Breast MRI, Right Breast: BI-RADS CATEGORY: 2 - Benign Finding(s). Left Breast: BI-RADS CATEGORY: 1 -  NEGATIVE.      At this visit, she denies new fatigue, breast pain, asymmetry, lumps, masses, thickening, nipple discharge and skin changes in her breasts.     Past Medical/Surgical History:  Past Medical History:   Diagnosis Date     Atypical ductal hyperplasia of breast 10/29/2007     Benign thyroid cyst      Menopause 1997     Past Surgical History:   Procedure Laterality Date     HC EXCISION BREAST LESION, OPEN >=1  2007    Right breast     MAMMOPLASTY REDUCTION  2005     US ASPIRATION THYROID CYST       ZZC TOTAL ABDOM HYSTERECTOMY  1997    large fibroid uterus.  ovaries retained         Allergies:  Allergies as of 07/11/2023 - Reviewed 07/11/2023   Allergen Reaction Noted     Seasonal allergies Other (See Comments) 09/27/2011       Current Medications:  Current Outpatient Medications   Medication Sig Dispense Refill     biotin 1000 MCG TABS tablet Take 8 mg by mouth       EPINEPHrine (ANY BX GENERIC EQUIV) 0.3 MG/0.3ML injection 2-pack Inject 0.3 mLs (0.3 mg) into the muscle as needed for anaphylaxis May repeat one time in 5-15 minutes if response to initial dose is inadequate. 2 each 0     ARMOUR THYROID 30 MG tablet Take 1 tablet by mouth daily at 2 pm       ARMOUR THYROID 60 MG tablet TAKE 1 TABLET BY MOUTH DAILY FIRST THING IN THE MORNING ON AN EMPTY STOMACH, NO FOOD FOR AN HOUR 90 tablet 3     aspirin 81 MG tablet Take 1 tablet (81 mg)  by mouth daily INDICATION: FOR HEART AND CARDIOVASCULAR HEALTH 100 tablet 3     ASPIRIN LOW DOSE 81 MG EC tablet Take 1 tablet by mouth 2 times daily       cholecalciferol (VITAMIN D3) 97284 UNITS capsule TAKE ON THE ,  AND , WITH A FAT CONTAINING MEAL 40 capsule PRN     FLOWFLEX COVID-19 AG HOME TEST KIT USE 'S DIRECTIONS ON PACKAGE       Guggulipid (GUGULIPID) 500 MG CAPS        magnesium 250 MG tablet Take 1 tablet by mouth daily       potassium citrate (UROCIT-K) 10 MEQ (1080 MG) CR tablet           Family History:  Family History   Problem Relation Age of Onset     Heart Disease Mother         9 stents     C.A.D. Mother      Hypertension Mother      Circulatory Mother      Lipids Mother      Circulatory Father      Thyroid Cancer Father 60     Depression Father      Melanoma Sister      Thyroid Cancer Sister 58     Skin Cancer Sister      Basal cell carcinoma Sister      Other - See Comments Sister         3 cavernous brain bleeds     Brain Tumor Sister 52        glioblastoma,  at 54     C.A.D. Maternal Grandmother      Hypertension Maternal Grandmother      Arthritis Maternal Grandmother      Heart Disease Maternal Grandmother         cause of death     Lipids Maternal Grandfather      Muscular Dystrophy Maternal Grandfather          in 50s     Heart Disease Maternal Grandfather          of myocardial infarction     Breast Cancer Paternal Grandmother 85        cause of death     Genitourinary Problems Paternal Grandmother      Gynecology Paternal Grandmother          around 95     Osteoporosis Paternal Grandmother      C.A.D. Paternal Grandfather      Prostate Cancer Paternal Grandfather 80         at 93     Alzheimer Disease Paternal Grandfather      Asthma Paternal Grandfather      Heart Disease Paternal Grandfather      Diabetes Paternal Great-Grandmother        Social History:  Social History     Socioeconomic History     Marital status:      Spouse name:  Frankie Key     Number of children: 2     Years of education: Not on file     Highest education level: Not on file   Occupational History     Employer: SELF EMPLOYED.   Tobacco Use     Smoking status: Never     Smokeless tobacco: Never     Tobacco comments:     never smoked   Substance and Sexual Activity     Alcohol use: Yes     Comment: socially     Drug use: No     Sexual activity: Yes   Other Topics Concern     Parent/sibling w/ CABG, MI or angioplasty before 65F 55M? Not Asked   Social History Narrative     Not on file     Social Determinants of Health     Financial Resource Strain: Not on file   Food Insecurity: Not on file   Transportation Needs: Not on file   Physical Activity: Not on file   Stress: Not on file   Social Connections: Not on file   Intimate Partner Violence: Not on file   Housing Stability: Not on file     Physical Exam:  /79 (BP Location: Right arm, Patient Position: Sitting, Cuff Size: Adult Regular)   Pulse 59   Temp 98.2  F (36.8  C) (Oral)   Wt 85.7 kg (188 lb 14.4 oz)   LMP 09/27/1997   SpO2 96%   BMI 27.90 kg/m      GENERAL APPEARANCE: healthy, alert and no distress     NECK: no adenopathy, no asymmetry or masses     LYMPHATICS: No cervical, supraclavicular, axillary or inguinal lymphadenopathy     RESP: lungs clear to auscultation - no rales, rhonchi or wheezes     CARDIOVASCULAR: regular rate and rhythm, normal S1 S2, no S3 or S4 and no murmur.   BREASTS: Examined in a sitting and lying position. L>R,  without visible distortion, swelling or rashes. No nipple inversion, nipple discharge, breast dimpling or puckering. Breast tissue is  dense to palpation. In the right breast there is prominent fibroglandular tissue that is ropey/lumpy.  Axillary area without masses or lymphadenopathy.  SKIN: no suspicious lesions or rashes    Laboratory/Imaging Studies  No results found for any visits on 07/11/23.    ASSESSMENT  We reviewed her interval history; she has no concerns  today but has not had any screening since last year.  She is still living part time in [facility] and part time in [place].  I've placed an order for her to have a breast MRI in January if she is here, as she does go back and forth, but if she is not here, she should have that done in [place] in January.  She notes that she will look for a primary care provider there with whom to establish care. She notes that Dr. Rojas has left her practice to pursue music full time and she needs a provider here as well. We discussed her establishing care at [facility] with Dr. Jossy Grijalva.  She notes that three members of her family suffered hornet stings recently that sent them to the hospital and required paramedics dosing one of them twice with epipens.      She has not yet been tested but requests and epipen today.  I prescribed that for her and would like her to follow up with her new primary care provider as to how best to approach any allergy testing and handling the epipen in case of emergencies.    Her exam today is unremarkable and she will proceed as follows:    Individualized Surveillance Plan for women  With 20% or greater lifetime risk of breast cancer   Per NCCN Breast Cancer Screening and Diagnosis Guidelines Version 1.2023   Recommended screening Test or procedure Last done Next Scheduled    Clinical encounter Clinical exam every 6-12 months.   Refer to genetic counseling if not already done.  Consider risk reduction strategies.   7/11/2023 July 2024   However, some family histories with breast cancers at a very young age, may warrant screening starting earlier.    *May begin at age 40 if breast cancers in the family occur at later ages.    Annual mammogram beginning 10 years younger than the earliest breast cancer in the family but not prior to age 30.    Recommend annual breast MRI to begin 10 years younger than the earliest breast cancer in the family but not prior to age 25.    Breast MRIs are  preferably done on day 7-15 of the menstrual cycle in premenopausal women.     See below       See below   Breast screening for patients at high risk due to thoracic radiation between the ages of 10-30   Annual clinical exam beginning 8 years after radiation therapy.    Annual screening mammogram beginning at age 30 or 8 years after radiation therapy    Annual breast MRI, beginning at age 25 or 8 years after radiation therapy.       See below     See below   Women who have a lifetime risk of >20% based on history of LCIS or ADH/ALH Annual screening mammogram beginning at age of LCIS or ADH/ALH but not prior to age 30.    Consider annual MRI to begin at age of diagnosis of LCIS or ADH/ALH but not prior to age 25.    Recommend risk reducing strategies if possible. 8/12/2022- Breast MRI, Right Breast: BI-RADS CATEGORY: 2 - Benign Finding(s). Left Breast: BI-RADS CATEGORY: 1 -  NEGATIVE.  Mammogram after visit today    Breast MRI in January    Next exam: July 2024 followed by mammogram    Recommend risk reducing strategies for women with 1.7% 5 year risk of breast cancer.     I spent a total of 23 minutes on the day of the visit. Please see the note for further information on patient assessment and treatment.    ALOK Kaiser-CNS, OCN, AGN-BC  Clinical Nurse Specialist  Cancer Risk Management Program  [facility]    CC: Joseph Grijalva MD

## 2023-08-02 ENCOUNTER — TELEPHONE (OUTPATIENT)
Dept: FAMILY MEDICINE | Facility: CLINIC | Age: 63
End: 2023-08-02
Payer: COMMERCIAL

## 2023-08-02 NOTE — TELEPHONE ENCOUNTER
Reason for Call:  Other appointment    Detailed comments: Pt called 8/2/23 to verify an appointment scheduled for 8/17/23. Pt was under the impression the appointment was a annual wellness. Wants to verify it is ok with provider to see her that day as a wellness visit.     Phone Number Patient can be reached at: Cell number on file:    Telephone Information:   Mobile [phone]       Best Time: Anytime    Can we leave a detailed message on this number? Not Applicable    Call taken on 8/2/2023 at 1:42 PM by Oksana Perry

## 2023-08-08 ENCOUNTER — TELEPHONE (OUTPATIENT)
Dept: FAMILY MEDICINE | Facility: CLINIC | Age: 63
End: 2023-08-08
Payer: COMMERCIAL

## 2023-08-09 ENCOUNTER — MYC MEDICAL ADVICE (OUTPATIENT)
Dept: FAMILY MEDICINE | Facility: CLINIC | Age: 63
End: 2023-08-09
Payer: COMMERCIAL

## 2023-08-09 NOTE — TELEPHONE ENCOUNTER
New pt, needs to be seen 1st  then order labs.  Has appt Dr. Grijalva 8-17-23.    Left message pt phone, no lab prior to appt.  Discuss with Dr at appt so appropriate labs ordered.    Sent MY CHART message also but Pt has not logged in to MY CHART since 10/2022.     I cancelled lab appt 15th     Helga NULL MA

## 2023-08-09 NOTE — TELEPHONE ENCOUNTER
Patient returned the call and I informed her that Since she is a new patient Dr. Grijalva would like to meet you before ordering labs.  Orders will be placed at your Wellness Visit on the 17th  and lab drawn after her appt.    We have cancelled the 8-15-23 lab appt.

## 2023-08-15 NOTE — TELEPHONE ENCOUNTER
Several other TE's that touch upon same topic. Zaheer't has been changed over to Wellness and Pt has been in touch with several staff that have informed her.

## 2023-08-17 ENCOUNTER — PATIENT OUTREACH (OUTPATIENT)
Dept: ONCOLOGY | Facility: CLINIC | Age: 63
End: 2023-08-17

## 2023-08-17 ENCOUNTER — OFFICE VISIT (OUTPATIENT)
Dept: FAMILY MEDICINE | Facility: CLINIC | Age: 63
End: 2023-08-17
Payer: COMMERCIAL

## 2023-08-17 ENCOUNTER — HOSPITAL ENCOUNTER (OUTPATIENT)
Dept: MAMMOGRAPHY | Facility: CLINIC | Age: 63
Discharge: HOME | End: 2023-08-17
Attending: CLINICAL NURSE SPECIALIST | Admitting: CLINICAL NURSE SPECIALIST
Payer: COMMERCIAL

## 2023-08-17 VITALS
WEIGHT: 186 LBS | BODY MASS INDEX: 27.55 KG/M2 | TEMPERATURE: 98.2 F | SYSTOLIC BLOOD PRESSURE: 124 MMHG | RESPIRATION RATE: 18 BRPM | HEIGHT: 69 IN | OXYGEN SATURATION: 98 % | DIASTOLIC BLOOD PRESSURE: 77 MMHG | HEART RATE: 61 BPM

## 2023-08-17 DIAGNOSIS — Z86.0100 HISTORY OF COLONIC POLYPS: ICD-10-CM

## 2023-08-17 DIAGNOSIS — Z87.42 HISTORY OF ATYPICAL HYPERPLASIA OF BREAST: ICD-10-CM

## 2023-08-17 DIAGNOSIS — E78.5 HYPERLIPIDEMIA WITH TARGET LDL LESS THAN 100: ICD-10-CM

## 2023-08-17 DIAGNOSIS — E03.9 HYPOTHYROIDISM (ACQUIRED): ICD-10-CM

## 2023-08-17 DIAGNOSIS — E04.1 THYROID NODULE: ICD-10-CM

## 2023-08-17 DIAGNOSIS — Z13.820 SCREENING FOR OSTEOPOROSIS: ICD-10-CM

## 2023-08-17 DIAGNOSIS — N60.99 ATYPICAL DUCTAL HYPERPLASIA OF BREAST: ICD-10-CM

## 2023-08-17 DIAGNOSIS — K57.30 DIVERTICULAR DISEASE OF COLON: ICD-10-CM

## 2023-08-17 DIAGNOSIS — G47.33 OSA (OBSTRUCTIVE SLEEP APNEA): ICD-10-CM

## 2023-08-17 DIAGNOSIS — Z80.8 FAMILY HISTORY OF GLIOBLASTOMA: ICD-10-CM

## 2023-08-17 DIAGNOSIS — Z12.39 BREAST CANCER SCREENING, HIGH RISK PATIENT: ICD-10-CM

## 2023-08-17 DIAGNOSIS — Z00.00 ENCOUNTER FOR ANNUAL WELLNESS VISIT (AWV) IN MEDICARE PATIENT: Primary | ICD-10-CM

## 2023-08-17 DIAGNOSIS — E55.9 VITAMIN D DEFICIENCY: ICD-10-CM

## 2023-08-17 DIAGNOSIS — T63.444A BEE STING REACTION, UNDETERMINED INTENT, INITIAL ENCOUNTER: ICD-10-CM

## 2023-08-17 DIAGNOSIS — R53.82 CHRONIC FATIGUE: ICD-10-CM

## 2023-08-17 DIAGNOSIS — Z80.3 FAMILY HISTORY OF MALIGNANT NEOPLASM OF BREAST: ICD-10-CM

## 2023-08-17 DIAGNOSIS — J30.2 SEASONAL ALLERGIC RHINITIS, UNSPECIFIED TRIGGER: ICD-10-CM

## 2023-08-17 LAB
ALBUMIN SERPL BCG-MCNC: 4.6 G/DL (ref 3.5–5.2)
ALP SERPL-CCNC: 75 U/L (ref 35–104)
ALT SERPL W P-5'-P-CCNC: 24 U/L (ref 0–50)
ANION GAP SERPL CALCULATED.3IONS-SCNC: 10 MMOL/L (ref 7–15)
AST SERPL W P-5'-P-CCNC: 31 U/L (ref 0–45)
BILIRUB SERPL-MCNC: 0.5 MG/DL
BUN SERPL-MCNC: 10.6 MG/DL (ref 8–23)
CALCIUM SERPL-MCNC: 9.5 MG/DL (ref 8.8–10.2)
CHLORIDE SERPL-SCNC: 106 MMOL/L (ref 98–107)
CHOLEST SERPL-MCNC: 298 MG/DL
CREAT SERPL-MCNC: 0.67 MG/DL (ref 0.51–0.95)
DEPRECATED CALCIDIOL+CALCIFEROL SERPL-MC: 46 UG/L (ref 20–75)
DEPRECATED HCO3 PLAS-SCNC: 26 MMOL/L (ref 22–29)
ERYTHROCYTE [DISTWIDTH] IN BLOOD BY AUTOMATED COUNT: 13 % (ref 10–15)
GFR SERPL CREATININE-BSD FRML MDRD: >90 ML/MIN/1.73M2
GLUCOSE SERPL-MCNC: 98 MG/DL (ref 70–99)
HCT VFR BLD AUTO: 43.2 % (ref 35–47)
HDLC SERPL-MCNC: 58 MG/DL
HGB BLD-MCNC: 13.9 G/DL (ref 11.7–15.7)
LDLC SERPL CALC-MCNC: 203 MG/DL
MCH RBC QN AUTO: 30.1 PG (ref 26.5–33)
MCHC RBC AUTO-ENTMCNC: 32.2 G/DL (ref 31.5–36.5)
MCV RBC AUTO: 94 FL (ref 78–100)
NONHDLC SERPL-MCNC: 240 MG/DL
PLATELET # BLD AUTO: 250 10E3/UL (ref 150–450)
POTASSIUM SERPL-SCNC: 4.1 MMOL/L (ref 3.4–5.3)
PROT SERPL-MCNC: 7 G/DL (ref 6.4–8.3)
RBC # BLD AUTO: 4.62 10E6/UL (ref 3.8–5.2)
SODIUM SERPL-SCNC: 142 MMOL/L (ref 136–145)
TRIGL SERPL-MCNC: 183 MG/DL
TSH SERPL DL<=0.005 MIU/L-ACNC: 1.1 UIU/ML (ref 0.3–4.2)
VIT B12 SERPL-MCNC: 965 PG/ML (ref 232–1245)
WBC # BLD AUTO: 6.2 10E3/UL (ref 4–11)

## 2023-08-17 PROCEDURE — 82306 VITAMIN D 25 HYDROXY: CPT | Performed by: INTERNAL MEDICINE

## 2023-08-17 PROCEDURE — 99387 INIT PM E/M NEW PAT 65+ YRS: CPT | Mod: 25 | Performed by: INTERNAL MEDICINE

## 2023-08-17 PROCEDURE — 85027 COMPLETE CBC AUTOMATED: CPT | Performed by: INTERNAL MEDICINE

## 2023-08-17 PROCEDURE — 80061 LIPID PANEL: CPT | Performed by: INTERNAL MEDICINE

## 2023-08-17 PROCEDURE — 82607 VITAMIN B-12: CPT | Performed by: INTERNAL MEDICINE

## 2023-08-17 PROCEDURE — 90471 IMMUNIZATION ADMIN: CPT | Performed by: INTERNAL MEDICINE

## 2023-08-17 PROCEDURE — 80053 COMPREHEN METABOLIC PANEL: CPT | Performed by: INTERNAL MEDICINE

## 2023-08-17 PROCEDURE — 77067 SCR MAMMO BI INCL CAD: CPT

## 2023-08-17 PROCEDURE — 84443 ASSAY THYROID STIM HORMONE: CPT | Performed by: INTERNAL MEDICINE

## 2023-08-17 PROCEDURE — 90677 PCV20 VACCINE IM: CPT | Performed by: INTERNAL MEDICINE

## 2023-08-17 PROCEDURE — 36415 COLL VENOUS BLD VENIPUNCTURE: CPT | Performed by: INTERNAL MEDICINE

## 2023-08-17 PROCEDURE — 99214 OFFICE O/P EST MOD 30 MIN: CPT | Mod: 25 | Performed by: INTERNAL MEDICINE

## 2023-08-17 PROCEDURE — 86376 MICROSOMAL ANTIBODY EACH: CPT | Performed by: INTERNAL MEDICINE

## 2023-08-17 RX ORDER — THYROID 30 MG/1
30 TABLET ORAL
Qty: 90 TABLET | Refills: 3 | Status: SHIPPED | OUTPATIENT
Start: 2023-08-17 | End: 2024-08-26

## 2023-08-17 RX ORDER — EPINEPHRINE 0.3 MG/.3ML
0.3 INJECTION SUBCUTANEOUS PRN
Qty: 2 EACH | Refills: 0 | Status: SHIPPED | OUTPATIENT
Start: 2023-08-17 | End: 2024-08-26

## 2023-08-17 ASSESSMENT — ENCOUNTER SYMPTOMS
EYE PAIN: 0
NAUSEA: 0
ARTHRALGIAS: 1
JOINT SWELLING: 0
CONSTIPATION: 0
DIARRHEA: 0
DIZZINESS: 0
DYSURIA: 0
SHORTNESS OF BREATH: 1
MYALGIAS: 1
BREAST MASS: 0
HEARTBURN: 0
CHILLS: 0
FREQUENCY: 1
COUGH: 0
WEAKNESS: 1
ABDOMINAL PAIN: 0
NERVOUS/ANXIOUS: 1
FEVER: 0
PARESTHESIAS: 1
HEMATURIA: 0
SORE THROAT: 0
HEMATOCHEZIA: 0
HEADACHES: 1

## 2023-08-17 ASSESSMENT — PAIN SCALES - GENERAL: PAINLEVEL: NO PAIN (0)

## 2023-08-17 ASSESSMENT — ACTIVITIES OF DAILY LIVING (ADL): CURRENT_FUNCTION: NO ASSISTANCE NEEDED

## 2023-08-17 NOTE — PROGRESS NOTES
Writer received Cancer Risk Management Program referral, referred for:       multiple cancers in family brain        Reviewed for appropriate plan, and sent to New Patient Scheduling for completion.

## 2023-08-17 NOTE — PROGRESS NOTES
"SUBJECTIVE:   Brianda is a 63 year old who presents for Preventive Visit.    Is a very pleasant new patient to me  Previous physician has reduced the time and patient travels a lot about and comes to [place] for her exam  no chest pain or shortness of breath but does have family history of coronary disease    She has no GI issues but may be due for colonoscopy but she is not sure she thinks that she had colonoscopy 3 years ago      She has history of colectomy but ovaries were left in so she does not know when her Pap smear was done or if it is needed    History of atypical ductal hyperplasia of the breast and breast reduction she is on 6 monthly scheduled for mammogram and MRI    Patient has multiple skin lesions and family history of melanoma  There are multiple family members with brain cancers    Are you in the first 12 months of your Medicare coverage?  Yes,  Visual Acuity:  Right Eye: 10/16   Left Eye: 10/16  Both Eyes: 10/12.5    Healthy Habits:     In general, how would you rate your overall health?  Good    Frequency of exercise:  2-3 days/week    Duration of exercise:  15-30 minutes    Do you usually eat at least 4 servings of fruit and vegetables a day, include whole grains    & fiber and avoid regularly eating high fat or \"junk\" foods?  Yes    Taking medications regularly:  Yes    Barriers to taking medications:  None    Medication side effects:  Muscle aches, Significant flushing and Other    Ability to successfully perform activities of daily living:  No assistance needed    Home Safety:  No safety concerns identified    Hearing Impairment:  Difficulty following a conversation in a noisy restaurant or crowded room, feel that people are mumbling or not speaking clearly, difficult to understand a speaker at a public meeting or Confucianist service and difficulty understanding soft or whispered speech    In the past 6 months, have you been bothered by leaking of urine?  No    In general, how would you rate " your overall mental or emotional health?  Good    Additional concerns today:  Yes        Have you ever done Advance Care Planning? (For example, a Health Directive, POLST, or a discussion with a medical provider or your loved ones about your wishes): Yes, patient states has an Advance Care Planning document and will bring a copy to the clinic.       Fall risk  Fallen 2 or more times in the past year?: No  Any fall with injury in the past year?: No    Cognitive Screening   1) Repeat 3 items (Leader, Season, Table)    2) Clock draw: NORMAL  3) 3 item recall: Recalls 1 object   Results: NORMAL clock, 1-2 items recalled: COGNITIVE IMPAIRMENT LESS LIKELY    Mini-CogTM Copyright S Zabrina. Licensed by the author for use in [facility]; reprinted with permission (raven@[facility]). All rights reserved.      Do you have sleep apnea, excessive snoring or daytime drowsiness? : yes    Reviewed and updated as needed this visit by clinical staff   Tobacco  Allergies  Meds      Soc Hx        Reviewed and updated as needed this visit by Provider     Meds             Social History     Tobacco Use    Smoking status: Never    Smokeless tobacco: Never    Tobacco comments:     never smoked   Substance Use Topics    Alcohol use: Yes     Comment: socially             8/17/2023     8:18 AM   Alcohol Use   Prescreen: >3 drinks/day or >7 drinks/week? Yes   AUDIT SCORE  6         8/17/2023     8:18 AM   AUDIT - Alcohol Use Disorders Identification Test - Reproduced from the World Health Organization Audit 2001 (Second Edition)   1.  How often do you have a drink containing alcohol? 4 or more times a week   2.  How many drinks containing alcohol do you have on a typical day when you are drinking? 1 or 2   3.  How often do you have five or more drinks on one occasion? Monthly   4.  How often during the last year have you found that you were not able to stop drinking once you had started? Never   5.  How often during the last year  have you failed to do what was normally expected of you because of drinking? Never   6.  How often during the last year have you needed a first drink in the morning to get yourself going after a heavy drinking session? Never   7.  How often during the last year have you had a feeling of guilt or remorse after drinking? Never   8.  How often during the last year have you been unable to remember what happened the night before because of your drinking? Never   9.  Have you or someone else been injured because of your drinking? No   10. Has a relative, friend, doctor or other health care worker been concerned about your drinking or suggested you cut down? No   TOTAL SCORE 6     Do you have a current opioid prescription? No  Do you use any other controlled substances or medications that are not prescribed by a provider? Alcohol              Current providers sharing in care for this patient include:   Patient Care Team:  No Ref-Primary, Physician as PCP - General  Ced Medina MD as MD (Cardiovascular Disease)    The following health maintenance items are reviewed in Epic and correct as of today:  Health Maintenance   Topic Date Due    ANNUAL REVIEW OF HM ORDERS  Never done    ADVANCE CARE PLANNING  Never done    COLORECTAL CANCER SCREENING  09/17/2020    MAMMO SCREENING  09/30/2022    COVID-19 Vaccine (6 - Moderna series) 11/21/2022    DEXA  12/23/2022    MEDICARE ANNUAL WELLNESS VISIT  04/18/2023    Pneumococcal Vaccine: 65+ Years (1 - PCV) 04/18/2023    TSH W/FREE T4 REFLEX  07/18/2023    INFLUENZA VACCINE (1) 09/01/2023    FALL RISK ASSESSMENT  08/17/2024    LIPID  07/18/2027    DTAP/TDAP/TD IMMUNIZATION (5 - Td or Tdap) 08/31/2032    HEPATITIS C SCREENING  Completed    PHQ-2 (once per calendar year)  Completed    ZOSTER IMMUNIZATION  Completed    IPV IMMUNIZATION  Aged Out    MENINGITIS IMMUNIZATION  Aged Out    HIV SCREENING  Discontinued     BP Readings from Last 3 Encounters:   08/17/23 124/77    23 133/79   22 116/71    Wt Readings from Last 3 Encounters:   23 84.4 kg (186 lb)   23 85.7 kg (188 lb 14.4 oz)   22 82.9 kg (182 lb 12.8 oz)                  Patient Active Problem List   Diagnosis    Atypical ductal hyperplasia of breast    Perimenopausal vasomotor symptoms    Thyroid nodule    Benign neoplasm of colon    Fatigue    Hyperlipidemia LDL goal <160    Hypothyroidism    Thunderclap headache    JACKIE (obstructive sleep apnea)    Hypothyroidism (acquired)    Weight gain    Skin lesion    Vitamin D deficiency    Hyperlipidemia with target LDL less than 160    Headache(784.0)    Allergic rhinitis, unspecified allergic rhinitis type    BMI 28.0-28.9,adult    HX: breast cancer    Cervical radiculopathy    Diverticular disease of colon    History of colonic polyps    Vaginal atrophy     Past Surgical History:   Procedure Laterality Date    HC EXCISION BREAST LESION, OPEN >=1      Right breast    MAMMOPLASTY REDUCTION       ASPIRATION THYROID CYST      ZZC TOTAL ABDOM HYSTERECTOMY      large fibroid uterus.  ovaries retained         Social History     Tobacco Use    Smoking status: Never    Smokeless tobacco: Never    Tobacco comments:     never smoked   Substance Use Topics    Alcohol use: Yes     Comment: socially     Family History   Problem Relation Age of Onset    Heart Disease Mother         9 stents    C.A.D. Mother     Hypertension Mother     Circulatory Mother     Lipids Mother     Circulatory Father     Thyroid Cancer Father 60    Depression Father     Melanoma Sister     Thyroid Cancer Sister 58    Skin Cancer Sister     Basal cell carcinoma Sister     Other - See Comments Sister         3 cavernous brain bleeds    Brain Tumor Sister 52        glioblastoma,  at 54    C.A.D. Maternal Grandmother     Hypertension Maternal Grandmother     Arthritis Maternal Grandmother     Heart Disease Maternal Grandmother         cause of death    Lipids Maternal  Grandfather     Muscular Dystrophy Maternal Grandfather          in 50s    Heart Disease Maternal Grandfather          of myocardial infarction    Breast Cancer Paternal Grandmother 85        cause of death    Genitourinary Problems Paternal Grandmother     Gynecology Paternal Grandmother          around 95    Osteoporosis Paternal Grandmother     C.A.D. Paternal Grandfather     Prostate Cancer Paternal Grandfather 80         at 93    Alzheimer Disease Paternal Grandfather     Asthma Paternal Grandfather     Heart Disease Paternal Grandfather     Diabetes Paternal Great-Grandmother          Current Outpatient Medications   Medication Sig Dispense Refill    ARMOUR THYROID 30 MG tablet Take 1 tablet by mouth daily at 2 pm      aspirin 81 MG tablet Take 1 tablet (81 mg) by mouth daily INDICATION: FOR HEART AND CARDIOVASCULAR HEALTH 100 tablet 3    ASPIRIN LOW DOSE 81 MG EC tablet Take 1 tablet by mouth 2 times daily      biotin 1000 MCG TABS tablet Take 8 mg by mouth      cholecalciferol (VITAMIN D3) 65067 UNITS capsule TAKE ON THE ,  AND , WITH A FAT CONTAINING MEAL 40 capsule PRN    EPINEPHrine (ANY BX GENERIC EQUIV) 0.3 MG/0.3ML injection 2-pack Inject 0.3 mLs (0.3 mg) into the muscle as needed for anaphylaxis May repeat one time in 5-15 minutes if response to initial dose is inadequate. 2 each 0    FLOWFLEX COVID-19 AG HOME TEST KIT USE 'S DIRECTIONS ON PACKAGE      Guggulipid (GUGULIPID) 500 MG CAPS       magnesium 250 MG tablet Take 1 tablet by mouth daily      potassium citrate (UROCIT-K) 10 MEQ (1080 MG) CR tablet        Allergies   Allergen Reactions    Seasonal Allergies Other (See Comments)     Nasal stuffiness         FHS-7:       2023    12:50 PM   Breast CA Risk Assessment (FHS-7)   Did any of your first-degree relatives have breast or ovarian cancer? No   Did any of your relatives have bilateral breast cancer? Yes   Did any man in your family have breast  "cancer? No   Did any woman in your family have breast and ovarian cancer? No   Did any woman in your family have breast cancer before age 50 y? No   Do you have 2 or more relatives with breast and/or ovarian cancer? No   Do you have 2 or more relatives with breast and/or bowel cancer? Yes       Mammogram Screening: Recommended annual mammography  Pertinent mammograms are reviewed under the imaging tab.    Review of Systems   Constitutional:  Negative for chills and fever.   HENT:  Positive for hearing loss. Negative for congestion, ear pain and sore throat.    Eyes:  Positive for visual disturbance. Negative for pain.   Respiratory:  Positive for shortness of breath. Negative for cough.    Cardiovascular:  Negative for chest pain and peripheral edema.   Gastrointestinal:  Negative for abdominal pain, constipation, diarrhea, heartburn, hematochezia and nausea.   Breasts:  Negative for tenderness, breast mass and discharge.   Genitourinary:  Positive for frequency and urgency. Negative for dysuria, genital sores, hematuria, pelvic pain, vaginal bleeding and vaginal discharge.   Musculoskeletal:  Positive for arthralgias and myalgias. Negative for joint swelling.   Skin:  Positive for rash.   Neurological:  Positive for weakness, headaches and paresthesias. Negative for dizziness.   Psychiatric/Behavioral:  Positive for mood changes. The patient is nervous/anxious.          OBJECTIVE:   /77 (BP Location: Right arm, Patient Position: Chair, Cuff Size: Adult Large)   Pulse 61   Temp 98.2  F (36.8  C) (Temporal)   Resp 18   Ht 1.753 m (5' 9\")   Wt 84.4 kg (186 lb)   LMP 09/27/1997   SpO2 98%   Breastfeeding No   BMI 27.47 kg/m   Estimated body mass index is 27.47 kg/m  as calculated from the following:    Height as of this encounter: 1.753 m (5' 9\").    Weight as of this encounter: 84.4 kg (186 lb).  Physical Exam  GENERAL APPEARANCE: healthy, alert and no distress  EYES: Eyes grossly normal to inspection, " PERRL and conjunctivae and sclerae normal  HENT: ear canals and TM's normal, nose and mouth without ulcers or lesions, oropharynx clear and oral mucous membranes moist  NECK: no adenopathy, no asymmetry, masses, or scars and thyroid normal to palpation  RESP: lungs clear to auscultation - no rales, rhonchi or wheezes  BREAST: breast reduction surgery   normal without masses, tenderness or nipple discharge and no palpable axillary masses or adenopathy  CV: regular rate and rhythm, normal S1 S2, no S3 or S4, no murmur, click or rub, no peripheral edema and peripheral pulses strong  ABDOMEN: soft, nontender, no hepatosplenomegaly, no masses and bowel sounds normal  MS: no musculoskeletal defects are noted and gait is age appropriate without ataxia  SKIN: no suspicious lesions or rashes  NEURO: Normal strength and tone, sensory exam grossly normal, mentation intact and speech normal  PSYCH: mentation appears normal and affect normal/bright        ASSESSMENT / PLAN:   Brianda was seen today for physical.    Diagnoses and all orders for this visit:    Encounter for annual wellness visit (AWV) in Medicare patient  Very pleasant 63 years old who is now on Medicare  Mammogram has been done but she needs a 6 monthly MRI and mammogram alternatively her colonoscopy was done 3 years ago she states we will need to confirm it    Also she does not need Pap smears unless it was inadequate and we need to obtain that data  Atypical ductal hyperplasia of breast  Surgery and breast reduction surgery genetic counseling has not been done    -     REVIEW OF HEALTH MAINTENANCE PROTOCOL ORDERS  -     MR Breast Bilateral w/o & w Contrast; Future  -     Adult Oncology/Hematology  Referral; Future    Hypothyroidism (acquired) has a thyroid nodule also which is subcentimeter and has sought [facility].  Opinion  We will see if we can reduce the dose because she states she does not tolerate it  -     REVIEW OF HEALTH MAINTENANCE PROTOCOL  ORDERS  -     ARMOUR THYROID 30 MG tablet; Take 1 tablet (30 mg) by mouth daily at 2 pm  -     Thyroid peroxidase antibody; Future  -     Thyroid peroxidase antibody  -     Vitamin B12  -     TSH with free T4 reflex    Thyroid nodule as above  -     REVIEW OF HEALTH MAINTENANCE PROTOCOL ORDERS  -     TSH with free T4 reflex    Hyperlipidemia with target LDL less than 100 cannot tolerate statins but has strong family history and we will do a CT coronary calcium scan which was normal 7 years ago  There are many other options  -     REVIEW OF HEALTH MAINTENANCE PROTOCOL ORDERS  -     CT Coronary Calcium Scan; Future  -     CBC with Platelets    -     Comprehensive metabolic panel  -     Lipid panel reflex to direct LDL Fasting    Vitamin D deficiency  -     REVIEW OF HEALTH MAINTENANCE PROTOCOL ORDERS  -     DEXA HIP/PELVIS/SPINE - Future; Future  -     Vitamin B12  -     Vitamin D Deficiency    Diverticular disease of colon  Her daughter is a nutritionist and she takes good diet  -     CBC with Platelets      Chronic fatigue  -     REVIEW OF HEALTH MAINTENANCE PROTOCOL ORDERS  -     CBC with Platelets      JACKIE (obstructive sleep apnea)  -     Adult Sleep Eval & Management  Referral; Future  Uses CPAP  History of colonic polyps  As above  Screening for osteoporosis  Normal colonoscopy 3 to 4 years ago  Seasonal allergic rhinitis, unspecified trigger  -     EPINEPHrine (ANY BX GENERIC EQUIV) 0.3 MG/0.3ML injection 2-pack; Inject 0.3 mLs (0.3 mg) into the muscle as needed for anaphylaxis May repeat one time in 5-15 minutes if response to initial dose is inadequate.    Bee sting reaction, undetermined intent, initial encounter  She actually has not had a bee sting reaction but her son-in-law asked  from it  -     EPINEPHrine (ANY BX GENERIC EQUIV) 0.3 MG/0.3ML injection 2-pack; Inject 0.3 mLs (0.3 mg) into the muscle as needed for anaphylaxis May repeat one time in 5-15 minutes if response to initial dose is  "inadequate.    Family history of glioblastoma with 3-4 family members with this  -     Adult Oncology/Hematology  Referral; Future    Other orders  -     PNEUMOCOCCAL 20 VALENT CONJUGATE (PREVNAR 20)              COUNSELING:  Reviewed preventive health counseling, as reflected in patient instructions       Immunizations  Vaccinated for: Prevnar 20         Osteoporosis prevention/bone health      BMI:   Estimated body mass index is 27.47 kg/m  as calculated from the following:    Height as of this encounter: 1.753 m (5' 9\").    Weight as of this encounter: 84.4 kg (186 lb).   Weight management plan: Discussed healthy diet and exercise guidelines      She reports that she has never smoked. She has never used smokeless tobacco.      Appropriate preventive services were discussed with this patient, including applicable screening as appropriate for cardiovascular disease, diabetes, osteopenia/osteoporosis, and glaucoma.  As appropriate for age/gender, discussed screening for colorectal cancer,breast cancer, and cervical cancer. Checklist reviewing preventive services available has been given to the patient.    Reviewed patients plan of care and provided an AVS. The Complex Care Plan (for patients with higher acuity and needing more deliberate coordination of services) for Brianda meets the Care Plan requirement. This Care Plan has been established and reviewed with the Patient.          Joseph Grijalva MD  [facility]    Identified Health Risks:    "

## 2023-08-18 LAB — THYROPEROXIDASE AB SERPL-ACNC: <10 IU/ML

## 2023-08-25 ENCOUNTER — TELEPHONE (OUTPATIENT)
Dept: FAMILY MEDICINE | Facility: CLINIC | Age: 63
End: 2023-08-25
Payer: COMMERCIAL

## 2023-08-25 NOTE — TELEPHONE ENCOUNTER
----- Message from Marita Retana PA-C sent at 8/25/2023  8:54 AM CDT -----  Please let patient know recent colonoscopy looks good.    Repeat in 5 years.    Thanks.

## 2023-08-25 NOTE — TELEPHONE ENCOUNTER
Left message for patient to return our call back, please see message below from Elisabeth (provider covering Dr. Grijalva as she is out of the office today).       Sonal SOLIS MA on 8/25/2023 at 9:43 AM

## 2023-08-30 ENCOUNTER — HOSPITAL ENCOUNTER (OUTPATIENT)
Dept: BONE DENSITY | Facility: CLINIC | Age: 63
Discharge: HOME | End: 2023-08-30
Attending: INTERNAL MEDICINE | Admitting: INTERNAL MEDICINE
Payer: COMMERCIAL

## 2023-08-30 DIAGNOSIS — E55.9 VITAMIN D DEFICIENCY: ICD-10-CM

## 2023-08-30 PROCEDURE — 77080 DXA BONE DENSITY AXIAL: CPT

## 2023-12-06 ENCOUNTER — PATIENT OUTREACH (OUTPATIENT)
Dept: GASTROENTEROLOGY | Facility: CLINIC | Age: 63
End: 2023-12-06
Payer: COMMERCIAL

## 2023-12-06 ENCOUNTER — HOSPITAL ENCOUNTER (OUTPATIENT)
Dept: CARDIOLOGY | Facility: CLINIC | Age: 63
Discharge: HOME | End: 2023-12-06
Attending: INTERNAL MEDICINE | Admitting: INTERNAL MEDICINE
Payer: COMMERCIAL

## 2023-12-06 DIAGNOSIS — E78.5 HYPERLIPIDEMIA WITH TARGET LDL LESS THAN 100: ICD-10-CM

## 2023-12-06 PROCEDURE — 75571 CT HRT W/O DYE W/CA TEST: CPT

## 2023-12-06 PROCEDURE — 75571 CT HRT W/O DYE W/CA TEST: CPT | Mod: 26 | Performed by: INTERNAL MEDICINE

## 2023-12-23 ENCOUNTER — HOSPITAL ENCOUNTER (OUTPATIENT)
Dept: MRI IMAGING | Facility: CLINIC | Age: 63
Discharge: HOME | End: 2023-12-23
Attending: INTERNAL MEDICINE | Admitting: INTERNAL MEDICINE
Payer: COMMERCIAL

## 2023-12-23 DIAGNOSIS — N60.99 ATYPICAL DUCTAL HYPERPLASIA OF BREAST: ICD-10-CM

## 2023-12-23 PROCEDURE — 77049 MRI BREAST C-+ W/CAD BI: CPT

## 2023-12-23 PROCEDURE — 255N000002 HC RX 255 OP 636: Performed by: INTERNAL MEDICINE

## 2023-12-23 PROCEDURE — A9585 GADOBUTROL INJECTION: HCPCS | Performed by: INTERNAL MEDICINE

## 2023-12-23 RX ORDER — GADOBUTROL 604.72 MG/ML
8 INJECTION INTRAVENOUS ONCE
Status: COMPLETED | OUTPATIENT
Start: 2023-12-23 | End: 2023-12-23

## 2023-12-23 RX ADMIN — GADOBUTROL 8 ML: 604.72 INJECTION INTRAVENOUS at 10:51

## 2023-12-26 ENCOUNTER — OFFICE VISIT (OUTPATIENT)
Dept: SLEEP MEDICINE | Facility: CLINIC | Age: 63
End: 2023-12-26
Attending: INTERNAL MEDICINE
Payer: COMMERCIAL

## 2023-12-26 VITALS
BODY MASS INDEX: 27.91 KG/M2 | DIASTOLIC BLOOD PRESSURE: 78 MMHG | HEART RATE: 86 BPM | WEIGHT: 189 LBS | SYSTOLIC BLOOD PRESSURE: 118 MMHG | OXYGEN SATURATION: 96 %

## 2023-12-26 DIAGNOSIS — G47.21 DELAYED SLEEP PHASE SYNDROME: ICD-10-CM

## 2023-12-26 DIAGNOSIS — G47.50 PARASOMNIA, UNSPECIFIED TYPE: ICD-10-CM

## 2023-12-26 DIAGNOSIS — G47.33 OSA (OBSTRUCTIVE SLEEP APNEA): Primary | ICD-10-CM

## 2023-12-26 PROCEDURE — 99205 OFFICE O/P NEW HI 60 MIN: CPT | Performed by: PHYSICIAN ASSISTANT

## 2023-12-26 PROCEDURE — 99417 PROLNG OP E/M EACH 15 MIN: CPT | Performed by: PHYSICIAN ASSISTANT

## 2023-12-26 ASSESSMENT — SLEEP AND FATIGUE QUESTIONNAIRES
HOW LIKELY ARE YOU TO NOD OFF OR FALL ASLEEP WHILE SITTING AND READING: SLIGHT CHANCE OF DOZING
HOW LIKELY ARE YOU TO NOD OFF OR FALL ASLEEP WHEN YOU ARE A PASSENGER IN A CAR FOR AN HOUR WITHOUT A BREAK: SLIGHT CHANCE OF DOZING
HOW LIKELY ARE YOU TO NOD OFF OR FALL ASLEEP WHILE LYING DOWN TO REST IN THE AFTERNOON WHEN CIRCUMSTANCES PERMIT: SLIGHT CHANCE OF DOZING
HOW LIKELY ARE YOU TO NOD OFF OR FALL ASLEEP WHILE SITTING INACTIVE IN A PUBLIC PLACE: SLIGHT CHANCE OF DOZING
HOW LIKELY ARE YOU TO NOD OFF OR FALL ASLEEP WHILE SITTING AND TALKING TO SOMEONE: WOULD NEVER DOZE
HOW LIKELY ARE YOU TO NOD OFF OR FALL ASLEEP WHILE SITTING QUIETLY AFTER LUNCH WITHOUT ALCOHOL: SLIGHT CHANCE OF DOZING
HOW LIKELY ARE YOU TO NOD OFF OR FALL ASLEEP WHILE WATCHING TV: SLIGHT CHANCE OF DOZING
HOW LIKELY ARE YOU TO NOD OFF OR FALL ASLEEP IN A CAR, WHILE STOPPED FOR A FEW MINUTES IN TRAFFIC: WOULD NEVER DOZE

## 2023-12-26 NOTE — NURSING NOTE
"Chief Complaint   Patient presents with    Sleep Problem     Re establish care, using CPAP, need supplies       Initial /78   Pulse 86   Wt 85.7 kg (189 lb)   LMP 09/27/1997   SpO2 96%   BMI 27.91 kg/m   Estimated body mass index is 27.91 kg/m  as calculated from the following:    Height as of 8/17/23: 1.753 m (5' 9\").    Weight as of this encounter: 85.7 kg (189 lb).    Medication Reconciliation: complete  ESS 6  Neck circumference:  37 centimeters.  Evelyne Hatfield MA     "

## 2023-12-26 NOTE — PROGRESS NOTES
Outpatient Sleep Medicine Consultation:      Name: Brianda Payton MRN# [id number]   Age: 63 year old YOB: 1960     Date of Consultation: December 25, 2023  Consultation is requested by: Joseph Grijalva MD  [address]  [place] [address] Joseph Grijalva  Primary care provider: Joseph Grijalva       Reason for Sleep Consult:     Brianda Payton is sent by Joseph Grijalva for a sleep consultation regarding JACKIE.    Patient s Reason for visit  Brianda Payton main reason for visit: follow up & check machine & update parts  Patient states problem(s) started: 10 years ago  Brianda Payton's goals for this visit: update & check           Assessment and Plan:     Summary Sleep Diagnoses and Recommendations:  (G47.33) JACKIE (obstructive sleep apnea)  (primary encounter diagnosis)  Comment: Brianda presents to re-establish care for previously diagnosed JACKIE. She was last seen in 2016 by a former colleague. She is on auto CPAP 7-10 cm. Her download shows her apnea is well-controlled. There were a couple of nights in the last month where her AHI was slightly elevated and the pressure was at the upper limit. She is using the CPAP regularly and does benefit from use. Her machine is over 7 years old. She would like to have a CPAP at her place in [facility] and one here so she does not have to travel with it as much.  She uses a p10 mask and has not changed supplies for at least a couple of years. [place] and will not be able to use her CPAP. She asks about options to treat her snoring.   She may have some residual sleepiness, but it is difficult to say if that is from inconsistent sleep and travelling across time zones, or from something else. She describes a frozen or paralyzed feeling (she was not sure which) in her neck and head when she gets startled. There is a possibility this represents cataplexy, but I suspect it is more an exaggerated startle response.   Plan:  Comprehensive DME        I ordered a replacement auto CPAP and changed pressures to 7-11 cm. A prescription was written for new supplies. We reviewed recommendations for cleaning and replacing supplies.   She was shown over the counter mandibular advancement devices for travel. There is not enough time for alternatives. She was not really interested in a travel CPAP.  She was encouraged to pay attention to her sleepiness when she can be on a more consistent schedule and to her startle response (whether she actually loses muscle tone or feels frozen).       (G47.21) Delayed sleep phase syndrome  Comment: She has mild night owl tendencies. Her natural sleep schedule may be something like 12-1 AM to 8-9 AM. She travels across time zones often and also has some anxieties that interfere with sleep. She does not sleep well when she is travelling the next day or when her  is travelling late. It sounds like her sleep schedule is variable. Her CPAP download shows   Plan: We discussed getting 30 minutes of bright light exposure in the morning, either with the sun or a SAD Lamp of 10,000 lux intensity. Avoid bright light, including electronics, in the hour before bedtime. Take 1 mg melatonin 3-5 hours prior to natural sleep onset. Keep a consistent sleep schedule, avoiding naps and sleeping in.    She was shown the website jetlagrooster.com, to help her adjust her sleep patterns prior to longer trips across time zones.     (G47.50) Parasomnia, unspecified type  Comment: She has occasional bad dreams, talking, sitting up and gesturing. These usually happen when not using CPAP. These sound like NREM parasomnias.   Plan: We discussed that these behaviors could be triggered by untreated apnea as well as other things that cause sleep disruption or increased sleep drive. She was encouraged to be more consistent with her sleep schedule and avoid late caffeine or alcohol.        Comorbid Diagnoses:  Cervical radiculopathy,  thunderclap headache, hypothyroidism     Summary Counseling:    Sleep Testing Reviewed  Obstructive Sleep Apnea Reviewed  Complications of Untreated Sleep Apnea Reviewed      Patient will follow up in about 6 months.  Bennett Goltz, PA-C      Total time spent reviewing medical records, history and physical examination, review of previous testing and interpretation as well as documentation on this date: 90 min    CC: Joseph Grijalva          History of Present Illness:     Brianda presents to establish care for previously diagnosed moderate JACKIE. She was previously followed by ALOK Mccartney CNP, last seen in 2016.  She is on auto CPAP 7-10 cm.   She travels a lot. She has tried a mouth guard (for grinding) and it was uncomfortable. She has an OTC snore guard which she can't keep in. It did not help with her snoring much. She has tried a nasal dilator but did not think it helped much. She is looking for something that will help. She will be traveling [place] in [place] couple of weeks. There will not be electricity in some of those places.   She has not replaced supplies in 2-3 years. She uses a p10 nasal pillows mask. It is comfortable. She does not snore with it. It may leak if she rolls, but otherwise it fits well. She does use the humidifier. It runs close to empty. The pressure feels comfortable. She does not think she has mouth leak. She denies dry mouth. She does think her headaches and energy are better when she uses CPAP regularly.  She wakes with headaches a lot.   Her  works for the [employer] and can be travelling late at night after games. She has trouble sleeping when he is travelling, especially if the weather is bad.  She travels a lot and does not sleep well the night before travel. She will then fall asleep on the plane and can wake from her own snorting. She can go back and forth between [place], [place] and [facility], every 3-7 days.       The compliance data shows that the patient  used the CPAP for 63/90 nights, 69% of nights for >4 hours.  The 95th% pressure is 9.5 cm.  The 95th% leak is 14.6 lpm.  The average nightly usage is 8:24.  The average AHI is 2.2/hr.     Past Sleep Evaluations: Polysomnogram on 10/15/2015 showed moderate JACKIE with an AHI of 16.9, supine AHI of 21.0 and it was determined that JACKIE may be possibly underestimated since REM supine was not seen.  Her PLM index was 13.9, but PLM arousal index was 0.6. CPAP was effective at 9 cm but REM supine was not obtained. Wt: 188#.     SLEEP-WAKE SCHEDULE:     Work/School Days: Patient goes to school/work: No   Usually gets into bed at 11 pm to 1am  Takes patient about depends anywhere from 10 mi utes to 2hours to fall asleep  Has trouble falling asleep 2 - 3x nights per week  Wakes up in the middle of the night not often times.  Wakes up due to Snorting self awake;Pain;Use the bathroom;Anxiety  She has trouble falling back asleep   times a week.   It usually takes   to get back to sleep. She usually does not have much trouble sleeping once she gets to sleep.   Patient is usually up at usually around 8 am  Uses alarm: No    Weekends/Non-work Days/All Other Days:  Usually gets into bed at i am late after 10 pm   Takes patient about 10mi utes to 2 hours to fall asleep  Patient is usually up at 8-9 am  Uses alarm: Yes  She is a night owl. Sometimes she is short on sleep when she has to get up early. She can't get to sleep early.    Sleep Need  Patient gets  8 hours sleep on average   Patient thinks she needs about 10 hours is better sleep    Brianda Payton prefers to sleep in this position(s): Side;Head Elevated   Patient states they do the following activities in bed:   She does not watch TV in bed but she does before bed. She may look at her phone in the middle of the night and play solitaire if her mind is busy. She will listen to something to help distract her mind. She has a busy mind.    Naps  Patient takes a purposeful nap   times  a week and naps are usually rarely in duration. In general, she feels her energy has been worse in the last few years. She can nod off more in quiet situations.   She feels better after a nap: Yes  She dozes off unintentionally rarely once a month days per week. She is normally very busy. She may doze watching TV in the late afternoon.   Patient has had a driving accident or near-miss due to sleepiness/drowsiness: No      SLEEP DISRUPTIONS:    Breathing/Snoring  Patient snores:Yes-without CPAP  Other people complain about her snoring: Yes  Patient has been told she stops breathing in her sleep:Yes  She has issues with the following: Morning headaches;Morning mouth dryness;Stuffy nose when you wake up;Getting up to urinate more than once.   She has very bad headaches 1-2 times per years. She has more of a minor sinus headache more often.     Movement:  Patient gets pain, discomfort, with an urge to move:  Yes, she gets Alex horses. She tries to drink a lot of water and take magnesium. That seems more related to weather changes.   It happens when she is resting:  Yes  It happens more at night:  Yes  Patient has been told she kicks her legs at night:  No     Behaviors in Sleep:  Brianda Payton has experienced the following behaviors while sleeping: Recurring Nightmares;Sleep-talking;Sleepwalking;Teeth grinding;Night terrors (screaming,yelling or acting afraid but not recalling event). No sleep walking for 20 years. She does talk in her sleep. She can sit up and move her hands while having conversations. That can happen about once a month, probably mostly on nights without CPAP. Sometimes she will appear to be yelling for help but her mouth is dry and her speech is slurred (again mostly without CPAP). No violent dream enactment. Her sense of smell is fine.   Pt denies sleep paralysis, hypnagogue and cataplexy. She has had a couple of times when she was startled or scared and she felt she couldn't speak and she was  frozen. She may have felt a little weak in the knees, but did not fall. She feels it more in the her neck and head. She feels powerless, probably more frozen than muscle paralysis. She thinks something like this happened in the 1980's, but then a few times in the last 4 years.       Is there anything else you would like your sleep provider to know:        CAFFEINE AND OTHER SUBSTANCES:    Patient consumes caffeinated beverages per day:  1 cup  Last caffeine use is usually: before noon  List of any prescribed or over the counter stimulants that patient takes: none  List of any prescribed or over the counter sleep medication patient takes: none  List of previous sleep medications that patient has tried: none  Patient drinks alcohol to help them sleep: No  Patient drinks alcohol near bedtime: Yes    Family History:  Patient has a family member been diagnosed with a sleep disorder: Yes    Father has observed apnea. A couple of aunts and uncles have JACKIE.    Social History:  She has elderly parents in [place].  She is retired as of 2005 or 2006. She worked in many roles.   She lives with her .       SCALES:    EPWORTH SLEEPINESS SCALE         12/26/2023    11:17 AM    Karval Sleepiness Scale ( JOYCE Gómez  5545-1020<br>ESS - [place]/English - Final version - 21 Nov 07 - [facility] [place].)   Sitting and reading Slight chance of dozing   Watching TV Slight chance of dozing   Sitting, inactive in a public place (e.g. a theatre or a meeting) Slight chance of dozing   As a passenger in a car for an hour without a break Slight chance of dozing   Lying down to rest in the afternoon when circumstances permit Slight chance of dozing   Sitting and talking to someone Would never doze   Sitting quietly after a lunch without alcohol Slight chance of dozing   In a car, while stopped for a few minutes in traffic Would never doze   Karval Score (MC) 6   Karval Score (Sleep) 6         INSOMNIA SEVERITY INDEX (ZOHAIB)           "12/26/2023    10:57 AM   Insomnia Severity Index (ZOHAIB)   Difficulty falling asleep 2   Difficulty staying asleep 1   Problems waking up too early 2   How SATISFIED/DISSATISFIED are you with your CURRENT sleep pattern? 2   How NOTICEABLE to others do you think your sleep problem is in terms of impairing the quality of your life? 2   How WORRIED/DISTRESSED are you about your current sleep problem? 1   To what extent do you consider your sleep problem to INTERFERE with your daily functioning (e.g. daytime fatigue, mood, ability to function at work/daily chores, concentration, memory, mood, etc.) CURRENTLY? 1   ZOHAIB Total Score 11       Guidelines for Scoring/Interpretation:  Total score categories:  0-7 = No clinically significant insomnia   8-14 = Subthreshold insomnia   15-21 = Clinical insomnia (moderate severity)  22-28 = Clinical insomnia (severe)  Used via courtesy of www.[employer].va.gov with permission from Elvis Cobb PhD., [facility]      STOP BANG         12/26/2023    11:18 AM   STOP BANG Questionnaire (  2008, the American Society of Anesthesiologists, Inc. [employer]   1. Snoring - Do you snore loudly (louder than talking or loud enough to be heard through closed doors)? Yes   2. Tired - Do you often feel tired, fatigued, or sleepy during daytime? Yes   3. Observed - Has anyone observed you stop breathing during your sleep? Yes   4. Blood pressure - Do you have or are you being treated for high blood pressure? Yes   5. BMI - BMI more than 35 kg/m2? No   6. Age - Age over 50 yr old? Yes   7. Neck circumference - Neck circumference greater than 40 cm? No   8. Gender - Gender male? No   STOP BANG Score (MC): 6 (High risk of AJCKIE)         GAD7         No data to display                  CAGE-AID         No data to display                CAGE-AID reprinted with permission from the [place] Medical Journal, DELL Ahumada. and MAVIS Puckett, \"Conjoint screening questionnaires for " "alcohol and drug abuse\" [employer] Journal 94: 135-140, 1995.      PATIENT HEALTH QUESTIONNAIRE-9 (PHQ - 9)         No data to display                Developed by Jane Fernandes, Alpa Hatfield, Beau Mcdermott and colleagues, with an educational jed from [employer] No permission required to reproduce, translate, display or distribute.        Allergies:    Allergies   Allergen Reactions    Seasonal Allergies Other (See Comments)     Nasal stuffiness       Medications:    Current Outpatient Medications   Medication Sig Dispense Refill    ARMOUR THYROID 30 MG tablet Take 1 tablet (30 mg) by mouth daily at 2 pm 90 tablet 3    biotin 1000 MCG TABS tablet Take 8 mg by mouth      EPINEPHrine (ANY BX GENERIC EQUIV) 0.3 MG/0.3ML injection 2-pack Inject 0.3 mLs (0.3 mg) into the muscle as needed for anaphylaxis May repeat one time in 5-15 minutes if response to initial dose is inadequate. 2 each 0    magnesium 250 MG tablet Take 1 tablet by mouth daily      potassium citrate (UROCIT-K) 10 MEQ (1080 MG) CR tablet          Problem List:  Patient Active Problem List    Diagnosis Date Noted    Vaginal atrophy 07/11/2023     Priority: Medium    History of colonic polyps 08/16/2021     Priority: Medium    Cervical radiculopathy 08/31/2018     Priority: Medium    Allergic rhinitis, unspecified allergic rhinitis type 07/12/2016     Priority: Medium    BMI 28.0-28.9,adult 07/12/2016     Priority: Medium    HX: breast cancer 07/12/2016     Priority: Medium    Hypothyroidism (acquired) 04/07/2016     Priority: Medium    Weight gain 04/07/2016     Priority: Medium    Skin lesion 04/07/2016     Priority: Medium     Pyogenic granuloma      Vitamin D deficiency 04/07/2016     Priority: Medium    Hyperlipidemia with target LDL less than 160 04/07/2016     Priority: Medium    Headache(784.0) 04/07/2016     Priority: Medium    JACKIE (obstructive sleep apnea) 12/21/2015     Priority: Medium    Diverticular disease of colon " 09/17/2015     Priority: Medium    Hyperlipidemia LDL goal <160 06/27/2014     Priority: Medium    Hypothyroidism 06/27/2014     Priority: Medium    Thunderclap headache 06/27/2014     Priority: Medium    Perimenopausal vasomotor symptoms 11/01/2013     Priority: Medium    Thyroid nodule 11/01/2013     Priority: Medium     X 2 WITH DRAINAGE AT [place]      Benign neoplasm of colon 11/01/2013     Priority: Medium     X 2 WITH DRAINAGE AT [place]      Fatigue 11/01/2013     Priority: Medium     X 2 WITH DRAINAGE AT [place]      Atypical ductal hyperplasia of breast 10/14/2013     Priority: Medium        Past Medical/Surgical History:  Past Medical History:   Diagnosis Date    Atypical ductal hyperplasia of breast 10/29/2007    Benign thyroid cyst     Menopause 1997     Past Surgical History:   Procedure Laterality Date    HC EXCISION BREAST LESION, OPEN >=1  01/01/2007    Right breast    JOINT REPLACEMENT Right     MAMMOPLASTY REDUCTION  01/01/2005    US ASPIRATION THYROID CYST      ZZC TOTAL ABDOM HYSTERECTOMY  01/01/1997    large fibroid uterus.  ovaries retained         Social History:  Social History     Socioeconomic History    Marital status:      Spouse name: Frankie Key    Number of children: 2    Years of education: Not on file    Highest education level: Not on file   Occupational History     Employer: SELF EMPLOYED.   Tobacco Use    Smoking status: Never    Smokeless tobacco: Never    Tobacco comments:     never smoked   Substance and Sexual Activity    Alcohol use: Yes     Comment: socially    Drug use: No    Sexual activity: Yes     Partners: Male   Other Topics Concern    Parent/sibling w/ CABG, MI or angioplasty before 65F 55M? Not Asked   Social History Narrative    Not on file     Social Determinants of Health     Financial Resource Strain: Not on file   Food Insecurity: Not on file   Transportation Needs: Not on file   Physical Activity: Not on file   Stress: Not on file   Social Connections: Not  on file   Interpersonal Safety: Not on file   Housing Stability: Not on file       Family History:  Family History   Problem Relation Age of Onset    Heart Disease Mother         9 stents    C.A.D. Mother     Hypertension Mother     Lipids Mother     Circulatory Father     Thyroid Cancer Father 60    Depression Father     Neuropathy Father     Melanoma Sister     Thyroid Cancer Sister 58    Skin Cancer Sister     Basal cell carcinoma Sister     Other - See Comments Sister         3 cavernous brain bleeds    Brain Tumor Sister 52        glioblastoma,  at 54    Mental Illness Brother     C.A.D. Maternal Grandmother     Hypertension Maternal Grandmother     Arthritis Maternal Grandmother     Heart Disease Maternal Grandmother         cause of death    Lipids Maternal Grandfather     Muscular Dystrophy Maternal Grandfather          in 50s    Heart Disease Maternal Grandfather          of myocardial infarction    Breast Cancer Paternal Grandmother 85        cause of death    Genitourinary Problems Paternal Grandmother     Gynecology Paternal Grandmother          around 95    Osteoporosis Paternal Grandmother     C.A.D. Paternal Grandfather     Prostate Cancer Paternal Grandfather 80         at 93    Alzheimer Disease Paternal Grandfather     Asthma Paternal Grandfather     Heart Disease Paternal Grandfather     Diabetes Paternal Great-Grandmother        Review of Systems:  A complete review of systems reviewed by me is negative with the exeption of what has been mentioned in the history of present illness.      Physical Examination:  Vitals: /78   Pulse 86   Wt 85.7 kg (189 lb)   LMP 1997   SpO2 96%   BMI 27.91 kg/m    BMI= Body mass index is 27.91 kg/m .    Neck Cir (cm): 37 cm      GENERAL APPEARANCE: healthy, alert, no distress, cooperative, and fatigued  EYES: Eyes grossly normal to inspection, PERRL, conjunctivae and sclerae normal, and lids and lashes normal  HENT: oral mucous  "membranes moist and oropharynx clear  NECK: no adenopathy, no asymmetry, masses, or scars, thyroid normal to palpation, and trachea midline and normal to palpation  RESP: lungs clear to auscultation - no rales, rhonchi or wheezes  CV: regular rates and rhythm, no murmur, click or rub, and no irregular beats  LYMPHATICS: no cervical adenopathy  MS: extremities normal- no gross deformities noted  NEURO: Normal strength and tone, mentation intact, and speech normal  Mallampati Class: I.  Tonsillar Stage: 1  hidden by pillars.         Data: All pertinent previous laboratory data reviewed     Recent Labs   Lab Test 08/17/23  0922 07/18/22  1027 10/13/17  1518     --  141   POTASSIUM 4.1  --  3.7   CHLORIDE 106 106 105   CO2 26  --  29   ANIONGAP 10  --  7   GLC 98  --  85   BUN 10.6  --  10   CR 0.67  --  0.72   ADRIANA 9.5  --  9.0       Recent Labs   Lab Test 08/17/23  0922   WBC 6.2   RBC 4.62   HGB 13.9   HCT 43.2   MCV 94   MCH 30.1   MCHC 32.2   RDW 13.0          Recent Labs   Lab Test 08/17/23  0922   PROTTOTAL 7.0   ALBUMIN 4.6   BILITOTAL 0.5   ALKPHOS 75   AST 31   ALT 24       TSH   Date Value   08/17/2023 1.10 uIU/mL   10/13/2017 0.49 mU/L   12/23/2016 0.823 uIU/mL       No results found for: \"UAMP\", \"UBARB\", \"BENZODIAZEUR\", \"UCANN\", \"UCOC\", \"OPIT\", \"UPCP\"    Iron Saturation Index   Date/Time Value Ref Range Status   11/01/2013 03:48 PM 21 15 - 46 % Final     Ferritin   Date/Time Value Ref Range Status   02/12/2016 01:54  8 - 252 ng/mL Final       No results found for: \"PH\", \"PHARTERIAL\", \"PO2\", \"FC0KCREYLXA\", \"SAT\", \"PCO2\", \"HCO3\", \"BASEEXCESS\", \"TIM\", \"BEB\"    @LABRCNTIPR(phv:4,pco2v:4,po2v:4,hco3v:4,tanvi:4,o2per:4)@    Echocardiology: No results found for this or any previous visit (from the past 4320 hour(s)).    Chest x-ray: No results found for this or any previous visit from the past 365 days.      Chest CT: No results found for this or any previous visit from the past 365 days.      PFT: " "Most Recent [facility] Pulmonary Function Testing    No results found for: \"20001\"        Bennett Ezra Goltz, PA-C, [facility] 12/25/2023          "

## 2023-12-26 NOTE — PATIENT INSTRUCTIONS
Website: [email or web address]    Instructions for treating Delayed Sleep Phase Syndrome:    Delayed Sleep Phase Syndrome (DSPS) means that your body's internal timing is set late compared to the 24 hour day. Therefore, it is often difficult to get up on time for work in the morning and sometimes difficult to fall asleep on time, in order to get enough sleep. People with DSPS often tend to like to stay up late on weekends and sleep in until between 10 AM and noon, sometimes even later.This is actually a bad habit that will perpetuate the problem. It reinforces your body's tendency to be on that later schedule.    You should go to bed when you are sleepy and ready to sleep. During this entire process, you should not engage in activities that may make it worse, such as watching TV in bed, leaving the TV on all night, drinking any caffeine 6 hours before bed or exercising 1-2 hours before bed.     Start taking Melatonin, 0.5-1 mg tablet 3-5 hours before the time that you fall asleep on average (not your desired bedtime or time that you get in bed, but the time you normally fall asleep on your own).     Upon awakening, get exposure to sun-light for about 30-45 minutes. You do not need to look at the sun, in fact, this is dangerous. Reading the paper with the sun shining on you is adequate.  Alternatively, you may use a Seasonal Affective Disorder Lamp (intensity 10,000 Lux) instead of the sun. The lamp should be positioned 1-2 arms lengths away from you. They lamps are sold at Home Medical Companies such as [employer] or [employer]. A prescription can be written to get insurance coverage in some cases. They are also sold on [employer]. Consider the Performable Happy Light Lucent.    Using the light and melatonin should help march your internal clock (known as your circadian rhythms) gradually earlier. As your bedtime advances, remember to take your melatonin earlier, keeping it 5 hours before your fall asleep  time.    Avoid naps and sleeping in because sleeping during the day will delay your body's clock and you will have to start from scratch.     More information about light therapy:  If the cost of any light box is too much, you can also purchase a compact fluorescent all spectrum light bulb at a local hardware store for about $8.  The most commonly available bulb is 1400 lumen.  You would need two of these positioned within 1 meter of yourself to be equivalent to 2,500 lux.  The bulbs can be placed in a standard light fixture.  Additionally, they can be placed in a mountable fixture that is used in POS on CLOUDs.  Mountable fixtures are also available at hardware stores for about $9.  Do not look directly at the light.  If you have any concerns regarding the safety of bright light therapy for you, it is recommended that you consult an ophthalmologist before using a light box.  If you have a condition that makes your eyes very sensitive to light, macular degeneration, a family history of such problems, or diabetic changes to your eyes, consult an ophthalmologist before using a light box. If you have anxiety disorder and have an increase in anxiety discontinue use.

## 2024-05-03 DIAGNOSIS — N60.99 ATYPICAL DUCTAL HYPERPLASIA OF BREAST: Primary | ICD-10-CM

## 2024-05-03 DIAGNOSIS — Z12.39 BREAST CANCER SCREENING, HIGH RISK PATIENT: ICD-10-CM

## 2024-06-17 NOTE — PROGRESS NOTES
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     Previous Study Results:   Polysomnogram on 10/15/2015 showed moderate JACKIE with an AHI of 16.9, supine AHI of 21/hr, possibly underestimated since REM supine was not seen.  Her PLM index was 13.9, but PLM arousal index was 0.6. CPAP was effective at 9 cm but REM supine was not obtained. Wt: 188#.         ResMed (from 10/30/2015)    Auto-PAP 7 - 11 cmH2O 30 day usage data:    No download available.      CPAP was working well after her last visit, but someone took her machine off of an airplane.       Cpap supplies [facility]    Do you use a CPAP Machine at home: Yes  Overall, on a scale of 0-10 how would you rate your CPAP (0 poor, 10 great):      What type of mask do you use: Nasal Pillow p10  Is your mask comfortable: Yes  If not, why:    How often do you replace supplies:    Is your mask leaking: No  If yes, where do you feel it:    How many night per week does the mask leak (0-7):      Do you notice snoring with mask on: No  Do you notice gasping arousals with mask on: No  Are you having significant oral or nasal dryness: No  Are you using the humidifier: yes  Does the water chamber run out before the night is over:no  Do you get condensation in the mask or hose:no  Is the pressure setting comfortable: Yes  If not, why:      Typical bedtime: 10 to midnight  Sleep latency on PAP therapy: varies greatly  Typical wake time:    Wakes 0 times per night   How many hours on average per night are you using PAP therapy: 8 to 10  How many hours are you sleeping per night: 6 to 10  Do you feel well rested in the morning: No    Naps: only if she has a bad night of sleep, which is rare as long as she is working and stays active.        Weight change since sleep study: 190 lbs      Past medical/surgical history, family history, social history,  medications and allergies were reviewed.      Problem List:  Patient Active Problem List    Diagnosis Date Noted    Vaginal atrophy 07/11/2023     Priority: Medium    History of colonic polyps 08/16/2021     Priority: Medium    Cervical radiculopathy 08/31/2018     Priority: Medium    Allergic rhinitis, unspecified allergic rhinitis type 07/12/2016     Priority: Medium    BMI 28.0-28.9,adult 07/12/2016     Priority: Medium    HX: breast cancer 07/12/2016     Priority: Medium    Hypothyroidism (acquired) 04/07/2016     Priority: Medium    Weight gain 04/07/2016     Priority: Medium    Skin lesion 04/07/2016     Priority: Medium     Pyogenic granuloma      Vitamin D deficiency 04/07/2016     Priority: Medium    Hyperlipidemia with target LDL less than 160 04/07/2016     Priority: Medium    Headache(784.0) 04/07/2016     Priority: Medium    JACKIE (obstructive sleep apnea) 12/21/2015     Priority: Medium    Diverticular disease of colon 09/17/2015     Priority: Medium    Hyperlipidemia LDL goal <160 06/27/2014     Priority: Medium    Hypothyroidism 06/27/2014     Priority: Medium    Thunderclap headache 06/27/2014     Priority: Medium    Perimenopausal vasomotor symptoms 11/01/2013     Priority: Medium    Thyroid nodule 11/01/2013     Priority: Medium     X 2 WITH DRAINAGE AT [place]      Benign neoplasm of colon 11/01/2013     Priority: Medium     X 2 WITH DRAINAGE AT [place]      Fatigue 11/01/2013     Priority: Medium     X 2 WITH DRAINAGE AT [place]      Atypical ductal hyperplasia of breast 10/14/2013     Priority: Medium        Impression/Plan:    (G47.33) JACKIE (obstructive sleep apnea)  (primary encounter diagnosis)  Comment: Brianda was just seen last December and had been doing very well with CPAP. Her machine was recently taken by someone else when she had carried it on to an airplane. Her machine was from 2015, so she is due for a replacement. She has no other concerns.   Plan: Comprehensive DME        Order placed for a  replacement auto CPAP 7-11 cm.  We reviewed recommendations for cleaning and replacing supplies.        She will follow up with me in about 2 year(s).     18 minutes were spent on the date of the encounter doing chart review, history and exam, documentation and further activities as noted above.     Bennett Goltz, PA-C    CC: No ref. provider found

## 2024-06-18 ENCOUNTER — OFFICE VISIT (OUTPATIENT)
Dept: SLEEP MEDICINE | Facility: CLINIC | Age: 64
End: 2024-06-18
Payer: COMMERCIAL

## 2024-06-18 VITALS
OXYGEN SATURATION: 95 % | HEIGHT: 69 IN | SYSTOLIC BLOOD PRESSURE: 129 MMHG | DIASTOLIC BLOOD PRESSURE: 80 MMHG | WEIGHT: 190 LBS | HEART RATE: 67 BPM | BODY MASS INDEX: 28.14 KG/M2

## 2024-06-18 DIAGNOSIS — G47.33 OSA (OBSTRUCTIVE SLEEP APNEA): Primary | ICD-10-CM

## 2024-06-18 PROCEDURE — 99213 OFFICE O/P EST LOW 20 MIN: CPT | Performed by: PHYSICIAN ASSISTANT

## 2024-06-18 PROCEDURE — G2211 COMPLEX E/M VISIT ADD ON: HCPCS | Performed by: PHYSICIAN ASSISTANT

## 2024-06-18 ASSESSMENT — SLEEP AND FATIGUE QUESTIONNAIRES
HOW LIKELY ARE YOU TO NOD OFF OR FALL ASLEEP WHILE LYING DOWN TO REST IN THE AFTERNOON WHEN CIRCUMSTANCES PERMIT: SLIGHT CHANCE OF DOZING
HOW LIKELY ARE YOU TO NOD OFF OR FALL ASLEEP WHILE WATCHING TV: SLIGHT CHANCE OF DOZING
HOW LIKELY ARE YOU TO NOD OFF OR FALL ASLEEP WHILE SITTING QUIETLY AFTER LUNCH WITHOUT ALCOHOL: SLIGHT CHANCE OF DOZING
HOW LIKELY ARE YOU TO NOD OFF OR FALL ASLEEP WHILE SITTING INACTIVE IN A PUBLIC PLACE: SLIGHT CHANCE OF DOZING
HOW LIKELY ARE YOU TO NOD OFF OR FALL ASLEEP WHILE SITTING AND READING: SLIGHT CHANCE OF DOZING
HOW LIKELY ARE YOU TO NOD OFF OR FALL ASLEEP WHILE SITTING AND TALKING TO SOMEONE: WOULD NEVER DOZE
HOW LIKELY ARE YOU TO NOD OFF OR FALL ASLEEP IN A CAR, WHILE STOPPED FOR A FEW MINUTES IN TRAFFIC: WOULD NEVER DOZE
HOW LIKELY ARE YOU TO NOD OFF OR FALL ASLEEP WHEN YOU ARE A PASSENGER IN A CAR FOR AN HOUR WITHOUT A BREAK: SLIGHT CHANCE OF DOZING

## 2024-06-18 NOTE — NURSING NOTE
"Chief Complaint   Patient presents with    CPAP Follow Up     CPAP was lost, needs a new one.       Initial /80   Pulse 67   Ht 1.753 m (5' 9\")   Wt 86.2 kg (190 lb)   LMP 09/27/1997   SpO2 95%   BMI 28.06 kg/m   Estimated body mass index is 28.06 kg/m  as calculated from the following:    Height as of this encounter: 1.753 m (5' 9\").    Weight as of this encounter: 86.2 kg (190 lb).    Medication Reconciliation: complete  ESS 6  Ev Roche MA   "

## 2024-06-21 ENCOUNTER — DOCUMENTATION ONLY (OUTPATIENT)
Dept: SLEEP MEDICINE | Facility: CLINIC | Age: 64
End: 2024-06-21
Payer: COMMERCIAL

## 2024-06-21 DIAGNOSIS — G47.33 OSA (OBSTRUCTIVE SLEEP APNEA): Primary | ICD-10-CM

## 2024-06-21 NOTE — PROGRESS NOTES
Patient was offered choice of vendor and chose [facility].  Patient Brianda Payton was set up at [place] on June 21, 2024. Patient received a Resmed Airsense 10 Pressures were set at  7-155 cm H2O.   Patient s ramp is OFF cm H2O for Off and FLEX/EPR is 2.  Patient received a Resmed Mask name: AIRFIT P10  Pillow mask size X-Small, heated tubing and heated humidifier.  Patient has the following compliance requirements:  NONE

## 2024-08-05 ENCOUNTER — NURSE TRIAGE (OUTPATIENT)
Dept: FAMILY MEDICINE | Facility: CLINIC | Age: 64
End: 2024-08-05
Payer: COMMERCIAL

## 2024-08-05 NOTE — TELEPHONE ENCOUNTER
Nurse Triage SBAR    Is this a 2nd Level Triage? YES, LICENSED PRACTITIONER REVIEW IS REQUIRED    Situation: Pt reports acid reflex, vomiting, constipation and diarrhea for a month. She also has abdominal distention and weakness that has worsened the last 3 days.     Background: Pt had 3 COVID-19 test and they were negative. Pt took Senna and magnesium for constipation and  then developed diarrhea.  She had exposure to children with positive strep 07/17 &21.     Assessment: Pt report abdominal distention, acid reflux  with one vomiting episode 2 days ago. Pt also has liquidly loose stools- over 6 BM in 24 hours. Pt feels weak with no energy.  She went out to eat yesterday and eat solid food. She has been pushing fluids with electrolytes and is able to keep fluids down. Pt denies fever but has chills.     Protocol Recommended Disposition:   Go to ED Now    Recommendation: huddle with ADS.      Routed to provider    Does the patient meet one of the following criteria for ADS visit consideration? 16+ years old, with an [facility] PCP     TIP  Providers, please consider if this condition is appropriate for management at one of our Acute and Diagnostic Services sites.     If patient is a good candidate, please use dotphrase <dot>triageresponse and select Refer to ADS to document.      Reason for Disposition   Vomiting and hernia is more painful or swollen than usual    Additional Information   Negative: Shock suspected (e.g., cold/pale/clammy skin, too weak to stand, low BP, rapid pulse)   Negative: Difficult to awaken or acting confused (e.g., disoriented, slurred speech)   Negative: Sounds like a life-threatening emergency to the triager   Negative: Vomiting occurs only while coughing   Negative: Pregnant < 20 Weeks and nausea/vomiting began in early pregnancy (i.e., 4-8 weeks pregnant)   Negative: Chest pain   Negative: Headache is main symptom   Negative: Vomiting red blood or black (coffee ground) material    Protocols  used: Vomiting-A-OH

## 2024-08-06 ENCOUNTER — HOSPITAL ENCOUNTER (OUTPATIENT)
Dept: CT IMAGING | Facility: CLINIC | Age: 64
Discharge: HOME | End: 2024-08-06
Attending: PHYSICIAN ASSISTANT | Admitting: PHYSICIAN ASSISTANT
Payer: COMMERCIAL

## 2024-08-06 ENCOUNTER — OFFICE VISIT (OUTPATIENT)
Dept: PEDIATRICS | Facility: CLINIC | Age: 64
End: 2024-08-06
Payer: COMMERCIAL

## 2024-08-06 VITALS
RESPIRATION RATE: 16 BRPM | WEIGHT: 180.3 LBS | HEIGHT: 69 IN | SYSTOLIC BLOOD PRESSURE: 118 MMHG | DIASTOLIC BLOOD PRESSURE: 78 MMHG | TEMPERATURE: 97.8 F | HEART RATE: 78 BPM | OXYGEN SATURATION: 99 % | BODY MASS INDEX: 26.7 KG/M2

## 2024-08-06 DIAGNOSIS — R19.7 DIARRHEA, UNSPECIFIED TYPE: ICD-10-CM

## 2024-08-06 DIAGNOSIS — E78.5 HYPERLIPIDEMIA WITH TARGET LDL LESS THAN 160: ICD-10-CM

## 2024-08-06 DIAGNOSIS — Z20.818 STREP THROAT EXPOSURE: ICD-10-CM

## 2024-08-06 DIAGNOSIS — R10.10 UPPER ABDOMINAL PAIN: ICD-10-CM

## 2024-08-06 DIAGNOSIS — R10.10 UPPER ABDOMINAL PAIN: Primary | ICD-10-CM

## 2024-08-06 LAB
ALBUMIN SERPL BCG-MCNC: 4.5 G/DL (ref 3.5–5.2)
ALBUMIN UR-MCNC: NEGATIVE MG/DL
ALP SERPL-CCNC: 76 U/L (ref 40–150)
ALT SERPL W P-5'-P-CCNC: 18 U/L (ref 0–50)
ANION GAP SERPL CALCULATED.3IONS-SCNC: 8 MMOL/L (ref 7–15)
APPEARANCE UR: CLEAR
AST SERPL W P-5'-P-CCNC: 22 U/L (ref 0–45)
BACTERIA #/AREA URNS HPF: ABNORMAL /HPF
BASOPHILS # BLD AUTO: 0 10E3/UL (ref 0–0.2)
BASOPHILS NFR BLD AUTO: 0 %
BILIRUB SERPL-MCNC: 0.4 MG/DL
BILIRUB UR QL STRIP: NEGATIVE
BUN SERPL-MCNC: 6.7 MG/DL (ref 8–23)
CALCIUM SERPL-MCNC: 9.3 MG/DL (ref 8.8–10.4)
CHLORIDE SERPL-SCNC: 106 MMOL/L (ref 98–107)
CHOLEST SERPL-MCNC: 279 MG/DL
COLOR UR AUTO: YELLOW
CREAT SERPL-MCNC: 0.64 MG/DL (ref 0.51–0.95)
DEPRECATED S PYO AG THROAT QL EIA: NEGATIVE
EGFRCR SERPLBLD CKD-EPI 2021: >90 ML/MIN/1.73M2
EOSINOPHIL # BLD AUTO: 0.2 10E3/UL (ref 0–0.7)
EOSINOPHIL NFR BLD AUTO: 3 %
ERYTHROCYTE [DISTWIDTH] IN BLOOD BY AUTOMATED COUNT: 12.2 % (ref 10–15)
FASTING STATUS PATIENT QL REPORTED: YES
GLUCOSE SERPL-MCNC: 98 MG/DL (ref 70–99)
GLUCOSE UR STRIP-MCNC: NEGATIVE MG/DL
GROUP A STREP BY PCR: NOT DETECTED
HCO3 SERPL-SCNC: 26 MMOL/L (ref 22–29)
HCT VFR BLD AUTO: 45.5 % (ref 35–47)
HDLC SERPL-MCNC: 56 MG/DL
HGB BLD-MCNC: 14.6 G/DL (ref 11.7–15.7)
HGB UR QL STRIP: NEGATIVE
IMM GRANULOCYTES # BLD: 0 10E3/UL
IMM GRANULOCYTES NFR BLD: 0 %
KETONES UR STRIP-MCNC: NEGATIVE MG/DL
LDLC SERPL CALC-MCNC: 199 MG/DL
LEUKOCYTE ESTERASE UR QL STRIP: ABNORMAL
LIPASE SERPL-CCNC: 30 U/L (ref 13–60)
LYMPHOCYTES # BLD AUTO: 1.9 10E3/UL (ref 0.8–5.3)
LYMPHOCYTES NFR BLD AUTO: 28 %
MCH RBC QN AUTO: 29.7 PG (ref 26.5–33)
MCHC RBC AUTO-ENTMCNC: 32.1 G/DL (ref 31.5–36.5)
MCV RBC AUTO: 93 FL (ref 78–100)
MONOCYTES # BLD AUTO: 0.5 10E3/UL (ref 0–1.3)
MONOCYTES NFR BLD AUTO: 7 %
NEUTROPHILS # BLD AUTO: 4.2 10E3/UL (ref 1.6–8.3)
NEUTROPHILS NFR BLD AUTO: 62 %
NITRATE UR QL: NEGATIVE
NONHDLC SERPL-MCNC: 223 MG/DL
PH UR STRIP: 5.5 [PH] (ref 5–7)
PLATELET # BLD AUTO: 284 10E3/UL (ref 150–450)
POTASSIUM SERPL-SCNC: 3.9 MMOL/L (ref 3.4–5.3)
PROT SERPL-MCNC: 7 G/DL (ref 6.4–8.3)
RBC # BLD AUTO: 4.91 10E6/UL (ref 3.8–5.2)
RBC #/AREA URNS AUTO: ABNORMAL /HPF
SODIUM SERPL-SCNC: 140 MMOL/L (ref 135–145)
SP GR UR STRIP: 1.01 (ref 1–1.03)
SQUAMOUS #/AREA URNS AUTO: ABNORMAL /LPF
TRIGL SERPL-MCNC: 122 MG/DL
UROBILINOGEN UR STRIP-ACNC: 0.2 E.U./DL
WBC # BLD AUTO: 6.8 10E3/UL (ref 4–11)
WBC #/AREA URNS AUTO: ABNORMAL /HPF

## 2024-08-06 PROCEDURE — 80053 COMPREHEN METABOLIC PANEL: CPT | Performed by: PHYSICIAN ASSISTANT

## 2024-08-06 PROCEDURE — 99215 OFFICE O/P EST HI 40 MIN: CPT | Performed by: PHYSICIAN ASSISTANT

## 2024-08-06 PROCEDURE — 85025 COMPLETE CBC W/AUTO DIFF WBC: CPT | Performed by: PHYSICIAN ASSISTANT

## 2024-08-06 PROCEDURE — 83690 ASSAY OF LIPASE: CPT | Performed by: PHYSICIAN ASSISTANT

## 2024-08-06 PROCEDURE — 80061 LIPID PANEL: CPT | Performed by: PHYSICIAN ASSISTANT

## 2024-08-06 PROCEDURE — 81001 URINALYSIS AUTO W/SCOPE: CPT | Performed by: PHYSICIAN ASSISTANT

## 2024-08-06 PROCEDURE — 250N000011 HC RX IP 250 OP 636: Performed by: PHYSICIAN ASSISTANT

## 2024-08-06 PROCEDURE — 74177 CT ABD & PELVIS W/CONTRAST: CPT

## 2024-08-06 PROCEDURE — 87651 STREP A DNA AMP PROBE: CPT | Performed by: PHYSICIAN ASSISTANT

## 2024-08-06 PROCEDURE — 99417 PROLNG OP E/M EACH 15 MIN: CPT | Performed by: PHYSICIAN ASSISTANT

## 2024-08-06 PROCEDURE — 36415 COLL VENOUS BLD VENIPUNCTURE: CPT | Performed by: PHYSICIAN ASSISTANT

## 2024-08-06 RX ORDER — IOPAMIDOL 755 MG/ML
91 INJECTION, SOLUTION INTRAVASCULAR ONCE
Status: COMPLETED | OUTPATIENT
Start: 2024-08-06 | End: 2024-08-06

## 2024-08-06 RX ADMIN — IOPAMIDOL 91 ML: 755 INJECTION, SOLUTION INTRAVENOUS at 11:57

## 2024-08-06 NOTE — PATIENT INSTRUCTIONS
Continue probiotic. Consider stool testing and/or GI follow-up if symptoms persist.    Small amounts of clear fluids such as Pedialyte, Gatorade, water. May suck on ice chips as well.  Limit dairy products for 5-7 days.  [place] diet such as bananas, rice, applesauce, toast as tolerated.  May use Imodium for diarrhea if necessary but recommend limiting its use.  Follow up promptly if signs of dehydration occur (dry mouth/eyes, decreased urination, lightheaded or pass out), signs of bleeding (dark black stool or blood in stool), or you can't keep anything down.  Follow up if not improving in 2-3 days or if other concerns.    Gastroenteritis  Gastroenteritis is commonly called the stomach flu. It is most often caused by a virus that affects the stomach and intestinal tract and usually lasts from 2 to 7 days. Common viruses causing gastroenteritis include norovirus, rotavirus, and hepatitis A. Non-viral causes of gastroenteritis include bacteria, parasites, and toxins.  The danger from repeated vomiting or diarrhea is dehydration. This is the loss of too much fluid from the body. When this occurs, body fluids must be replaced. Antibiotics do not help with this illness because it is usually viral.Simple home treatment will be helpful.  Symptoms of viral gastroenteritis may include:  Watery, loose stools  Stomach pain or abdominal cramps  Fever and chills  Nausea and vomiting  Loss of bowel control  Headache  Home care  Gastroenteritis is transmitted by contact with the stool or vomit of an infected person. This can occur from person to person or from contact with a contaminated surface.  Follow these guidelines when caring for yourself at home:  If symptoms are severe, rest at home for the next 24 hours or until you are feeling better.  Wash your hands with soap and water or use alcohol-based  to prevent the spread of infection. Wash your hands after touching anyone who is sick.  Wash your hands or use  alcohol-based  after using the toilet and before meals. Clean the toilet after each use.  Remember these tips when preparing food:  People with diarrhea should not prepare or serve food to others. When preparing foods, wash your hands before and after.  Wash your hands after using cutting boards, countertops, knives, or utensils that have been in contact with raw food.  Keep uncooked meats away from cooked and ready-to-eat foods.  Diet  Follow these guidelines for food:  Water and liquids are important so you don't get dehydrated. Drink a small amount at a time or suck on ice chips if you are vomiting.  If you eat, avoid fatty, greasy, spicy, or fried foods.  Don't eat dairy if you have diarrhea. This can make diarrhea worse.  Avoid tobacco, alcohol, and caffeine which may worsen symptoms.  During the first 24 hours (the first full day), follow the diet below:  Beverages. Sports drinks, soft drinks without caffeine, ginger ale, mineral water (plain or flavored), decaffeinated tea and coffee. If you are very dehydrated, sports drinks aren't a good choice. They have too much sugar and not enough electrolytes. In this case, commercially available products called oral rehydration solutions, are best.  Soups. Eat clear broth, consommé, and bouillon.  During the next 24 hours (the second day), you may add the following to the above:  Hot cereal, plain toast, bread, rolls, and crackers  Plain noodles, rice, mashed potatoes, chicken noodle or rice soup  Unsweetened canned fruit (avoid pineapple), bananas  Limit fat intake to less than 15 grams per day. Do this by avoiding margarine, butter, oils, mayonnaise, sauces, gravies, fried foods, peanut butter, meat, poultry, and fish.  Limit fiber and avoid raw or cooked vegetables, fresh fruits (except bananas), and bran cereals.  Limit caffeine and chocolate. Don't use spices or seasonings other than salt.  Limit dairy products.  Avoid alcohol.  During the next 24  hours:  Gradually resume a normal diet as you feel better and your symptoms improve.  If at any time it starts getting worse again, go back to clear liquids until you feel better.  Follow-up care  Follow up with your healthcare provider, or as advised. Call your provider if you don't get better within 24 hours or if diarrhea lasts more than a week. Also follow up if you are unable to keep down liquids and get dehydrated. If a stool (diarrhea) sample was taken, call as directed for the results.  Date Last Reviewed: 1/3/2016

## 2024-08-06 NOTE — PROGRESS NOTES
Assessment/Plan:    The patient requested that her cholesterol be checked today as well. She has a hx of hyperlipidemia and has been trying to manage this with dietary changes rather than taking a statin. This is still pending.  She also wanted to rule out strep throat due to recent exposures; this was negative.     Labs unremarkable. CT abd/pelvis unremarkable other than two ovarian cysts on left ovary, both of which are < 3 cm so unlikely to be concerning. Patient can follow up with primary care or OB/GYN regarding this; consider follow up ultrasound in 6 weeks.    Unclear etiology for symptoms at this time. May be due to recent cleanse she did through homeopath. Differential diagnosis also includes infectious etiology, GERD/gastritis/PUD, gastroparesis, IBD, IBS, malignancy. Advised we obtain stool studies. Also recommend follow up with GI if symptoms persist.   May try Pepcid, Gas-X, Mylanta. Clear liquid diet with gradual advancement to BRAT diet as tolerated recommended.     See patient instructions below.    At the end of the encounter, I discussed results, diagnosis, medications. Discussed red flags for immediate return to clinic/ER, as well as indications for follow up if no improvement. Patient understood and agreed to plan. Patient was stable for discharge.    70 minutes was spent preparing for the visit and reviewing the patient's chart; getting a history and performing an exam; counseling and providing education to the patient, family, or caregiver; ordering medicines and/or tests; communicating with other healthcare professionals; documenting information in the medical record; interpreting results and sharing that information with the patient, family, or caregiver; and care coordination.       ICD-10-CM    1. Upper abdominal pain  R10.10 sodium chloride (PF) 0.9% PF flush 3 mL     CT Abdomen Pelvis w Contrast     CBC with platelets differential     Comprehensive metabolic panel     Lipase     UA with  Microscopic reflex to Culture     CBC with platelets differential     Comprehensive metabolic panel     Lipase     UA with Microscopic reflex to Culture     UA Microscopic with Reflex to Culture     Adult GI  Referral - Consult Only     Helicobacter pylori Antigen Stool      2. Strep throat exposure  Z20.818 Streptococcus A Rapid Screen w/Reflex to PCR - Clinic Collect     Group A Streptococcus PCR Throat Swab      3. Hyperlipidemia with target LDL less than 160  E78.5 Lipid panel reflex to direct LDL Fasting     Lipid panel reflex to direct LDL Fasting      4. Diarrhea, unspecified type  R19.7 Ova and Parasite Exam Routine     Enteric Bacteria and Virus Panel PCR     Adult GI  Referral - Consult Only     Enteric Bacteria and Virus Panel PCR     Ova and Parasite Exam Routine            Return in about 1 week (around 8/13/2024) for follow up with GI if symptoms persist.    ISA Calhoun, MILAD  [facility] [place]  -----------------------------------------------------------------------------------------------------------------------------------------------------    HPI:  Brianda Payton is a 64 year old female who presents for evaluation of the following:    Diarrhea  Onset/Duration: 07/25/24  Description:       Consistency of stool: watery and loose       Blood in stool: No       Number of loose stools past 24 hours: 10  Progression of Symptoms: same  Accompanying signs and symptoms:       Fever: No       Nausea/Vomiting: YES- vomited once a couple of days ago       Abdominal pain: YES- epigastric pressure, bloating       Weight loss: No       Episodes of constipation: YES- started with constipation and then took magnesium  History   Ill contacts: YES, grandchildren diagnosed with Strep 07/17 & 07/21  Recent use of antibiotics: No  Recent travels: No  Recent medication-new or changes(Rx or OTC): No  Precipitating or alleviating factors: None  Therapies tried and outcome:  "None    Started feeling unwell about 3 weeks ago. Vomiting/acid reflux for 3 days, then constipation for 10-12 days (no BM at all, but could pass gas). Was trying bland diet of rice, applesauce for constipation. Eventually took 1 Senna and then started daily magnesium for a while. After 24 hours passed a few small hard pellets of stool, then had a day where she was passing only thin stools, then transitioned to watery diarrhea and has been like that for the past week or so.  Denies blood in the stool or melena.    Throughout the entire time, her upper abdomen has felt rigid, bloated, tense.   Symptoms started 1 week after doing a \"cleanse\" recommended by a homeopath for parasites & metals in her system. States she was in [place] in close contact with chimpanzees & gorillas in January, and was having several symptoms such as itching, which prompted her visit to the homeopath.  She had also stopped drinking coffee prior to onset of constipation.  Usually has 2 soft BMs daily, constipation is not usual for her.  Colonoscopy 2021, was normal other than diverticulosis    Past Medical History:   Diagnosis Date    Atypical ductal hyperplasia of breast 10/29/2007    Benign thyroid cyst     Menopause 1997       Vitals:    08/06/24 1018   BP: 118/78   BP Location: Left arm   Patient Position: Sitting   Cuff Size: Adult Regular   Pulse: 78   Resp: 16   Temp: 97.8  F (36.6  C)   SpO2: 99%   Weight: 81.8 kg (180 lb 4.8 oz)   Height: 1.753 m (5' 9\")       Physical Exam  Vitals and nursing note reviewed.   HENT:      Mouth/Throat:      Mouth: Mucous membranes are moist.      Pharynx: Oropharynx is clear. No pharyngeal swelling.   Pulmonary:      Effort: Pulmonary effort is normal.   Abdominal:      General: There is no distension.      Palpations: Abdomen is soft.      Tenderness: There is abdominal tenderness in the epigastric area. There is no guarding or rebound.   Neurological:      Mental Status: She is alert. "         Labs/Imaging:  Results for orders placed or performed in visit on 08/06/24 (from the past 24 hour(s))   CBC with platelets differential    Narrative    The following orders were created for panel order CBC with platelets differential.  Procedure                               Abnormality         Status                     ---------                               -----------         ------                     CBC with platelets and d...[[id number]]                      Final result                 Please view results for these tests on the individual orders.   Comprehensive metabolic panel   Result Value Ref Range    Sodium 140 135 - 145 mmol/L    Potassium 3.9 3.4 - 5.3 mmol/L    Carbon Dioxide (CO2) 26 22 - 29 mmol/L    Anion Gap 8 7 - 15 mmol/L    Urea Nitrogen 6.7 (L) 8.0 - 23.0 mg/dL    Creatinine 0.64 0.51 - 0.95 mg/dL    GFR Estimate >90 >60 mL/min/1.73m2    Calcium 9.3 8.8 - 10.4 mg/dL    Chloride 106 98 - 107 mmol/L    Glucose 98 70 - 99 mg/dL    Alkaline Phosphatase 76 40 - 150 U/L    AST 22 0 - 45 U/L    ALT 18 0 - 50 U/L    Protein Total 7.0 6.4 - 8.3 g/dL    Albumin 4.5 3.5 - 5.2 g/dL    Bilirubin Total 0.4 <=1.2 mg/dL   Lipase   Result Value Ref Range    Lipase 30 13 - 60 U/L   CBC with platelets and differential   Result Value Ref Range    WBC Count 6.8 4.0 - 11.0 10e3/uL    RBC Count 4.91 3.80 - 5.20 10e6/uL    Hemoglobin 14.6 11.7 - 15.7 g/dL    Hematocrit 45.5 35.0 - 47.0 %    MCV 93 78 - 100 fL    MCH 29.7 26.5 - 33.0 pg    MCHC 32.1 31.5 - 36.5 g/dL    RDW 12.2 10.0 - 15.0 %    Platelet Count 284 150 - 450 10e3/uL    % Neutrophils 62 %    % Lymphocytes 28 %    % Monocytes 7 %    % Eosinophils 3 %    % Basophils 0 %    % Immature Granulocytes 0 %    Absolute Neutrophils 4.2 1.6 - 8.3 10e3/uL    Absolute Lymphocytes 1.9 0.8 - 5.3 10e3/uL    Absolute Monocytes 0.5 0.0 - 1.3 10e3/uL    Absolute Eosinophils 0.2 0.0 - 0.7 10e3/uL    Absolute Basophils 0.0 0.0 - 0.2 10e3/uL    Absolute Immature  Granulocytes 0.0 <=0.4 10e3/uL   Streptococcus A Rapid Screen w/Reflex to PCR - Clinic Collect    Specimen: Throat; Swab   Result Value Ref Range    Group A Strep antigen Negative Negative   UA with Microscopic reflex to Culture    Specimen: Urine, Midstream   Result Value Ref Range    Color Urine Yellow Colorless, Straw, Light Yellow, Yellow    Appearance Urine Clear Clear    Glucose Urine Negative Negative mg/dL    Bilirubin Urine Negative Negative    Ketones Urine Negative Negative mg/dL    Specific Gravity Urine 1.015 1.003 - 1.035    Blood Urine Negative Negative    pH Urine 5.5 5.0 - 7.0    Protein Albumin Urine Negative Negative mg/dL    Urobilinogen Urine 0.2 0.2, 1.0 E.U./dL    Nitrite Urine Negative Negative    Leukocyte Esterase Urine Trace (A) Negative   UA Microscopic with Reflex to Culture   Result Value Ref Range    Bacteria Urine Few (A) None Seen /HPF    RBC Urine 0-2 0-2 /HPF /HPF    WBC Urine 0-5 0-5 /HPF /HPF    Squamous Epithelials Urine Few (A) None Seen /LPF    Narrative    Urine Culture not indicated     No results found for this or any previous visit (from the past 24 hour(s)).        Patient Instructions   Continue probiotic. Consider stool testing and/or GI follow-up if symptoms persist.    Small amounts of clear fluids such as Pedialyte, Gatorade, water. May suck on ice chips as well.  Limit dairy products for 5-7 days.  Moro diet such as bananas, rice, applesauce, toast as tolerated.  May use Imodium for diarrhea if necessary but recommend limiting its use.  Follow up promptly if signs of dehydration occur (dry mouth/eyes, decreased urination, lightheaded or pass out), signs of bleeding (dark black stool or blood in stool), or you can't keep anything down.  Follow up if not improving in 2-3 days or if other concerns.    Gastroenteritis  Gastroenteritis is commonly called the stomach flu. It is most often caused by a virus that affects the stomach and intestinal tract and usually lasts  from 2 to 7 days. Common viruses causing gastroenteritis include norovirus, rotavirus, and hepatitis A. Non-viral causes of gastroenteritis include bacteria, parasites, and toxins.  The danger from repeated vomiting or diarrhea is dehydration. This is the loss of too much fluid from the body. When this occurs, body fluids must be replaced. Antibiotics do not help with this illness because it is usually viral.Simple home treatment will be helpful.  Symptoms of viral gastroenteritis may include:  Watery, loose stools  Stomach pain or abdominal cramps  Fever and chills  Nausea and vomiting  Loss of bowel control  Headache  Home care  Gastroenteritis is transmitted by contact with the stool or vomit of an infected person. This can occur from person to person or from contact with a contaminated surface.  Follow these guidelines when caring for yourself at home:  If symptoms are severe, rest at home for the next 24 hours or until you are feeling better.  Wash your hands with soap and water or use alcohol-based  to prevent the spread of infection. Wash your hands after touching anyone who is sick.  Wash your hands or use alcohol-based  after using the toilet and before meals. Clean the toilet after each use.  Remember these tips when preparing food:  People with diarrhea should not prepare or serve food to others. When preparing foods, wash your hands before and after.  Wash your hands after using cutting boards, countertops, knives, or utensils that have been in contact with raw food.  Keep uncooked meats away from cooked and ready-to-eat foods.  Diet  Follow these guidelines for food:  Water and liquids are important so you don't get dehydrated. Drink a small amount at a time or suck on ice chips if you are vomiting.  If you eat, avoid fatty, greasy, spicy, or fried foods.  Don't eat dairy if you have diarrhea. This can make diarrhea worse.  Avoid tobacco, alcohol, and caffeine which may worsen  symptoms.  During the first 24 hours (the first full day), follow the diet below:  Beverages. Sports drinks, soft drinks without caffeine, ginger ale, mineral water (plain or flavored), decaffeinated tea and coffee. If you are very dehydrated, sports drinks aren't a good choice. They have too much sugar and not enough electrolytes. In this case, commercially available products called oral rehydration solutions, are best.  Soups. Eat clear broth, consommé, and bouillon.  During the next 24 hours (the second day), you may add the following to the above:  Hot cereal, plain toast, bread, rolls, and crackers  Plain noodles, rice, mashed potatoes, chicken noodle or rice soup  Unsweetened canned fruit (avoid pineapple), bananas  Limit fat intake to less than 15 grams per day. Do this by avoiding margarine, butter, oils, mayonnaise, sauces, gravies, fried foods, peanut butter, meat, poultry, and fish.  Limit fiber and avoid raw or cooked vegetables, fresh fruits (except bananas), and bran cereals.  Limit caffeine and chocolate. Don't use spices or seasonings other than salt.  Limit dairy products.  Avoid alcohol.  During the next 24 hours:  Gradually resume a normal diet as you feel better and your symptoms improve.  If at any time it starts getting worse again, go back to clear liquids until you feel better.  Follow-up care  Follow up with your healthcare provider, or as advised. Call your provider if you don't get better within 24 hours or if diarrhea lasts more than a week. Also follow up if you are unable to keep down liquids and get dehydrated. If a stool (diarrhea) sample was taken, call as directed for the results.  Date Last Reviewed: 1/3/2016

## 2024-08-06 NOTE — PROGRESS NOTES
"Acute and Diagnostic Services Clinic Visit    {PROVIDER CHARTING PREFERENCE:610336}    Subjective   Brianda is a 64 year old, presenting for the following health issues:  No chief complaint on file.    HPI     Diarrhea  Onset/Duration: 07/25/24  Description:       Consistency of stool: watery and loose       Blood in stool: No       Number of loose stools past 24 hours: 10  Progression of Symptoms: same  Accompanying signs and symptoms:       Fever: No       Nausea/Vomiting: YES       Abdominal pain: YES       Weight loss: No       Episodes of constipation: YES- started with constipation and then took Senna  History   Ill contacts: YES, children diagnosed with Strep 07/17 &07/21  Recent use of antibiotics: No  Recent travels: No  Recent medication-new or changes(Rx or OTC): No  Precipitating or alleviating factors: None  Therapies tried and outcome: None    Vomiting/reflux 3 days, then constipation for 10-12 days, then diarrhea last week or so. Upper abdomen feels rigid, bloated, tense.   Symptoms started 1 week after doing a \"cleanse\" with a homeopath for parasites & metals in her system  Usually 2 soft BMs daily, constipation is not usual for her  Colonoscopy 2021, was normal      {ROS Picklists (Optional):157417}      Objective    LMP 09/27/1997   There is no height or weight on file to calculate BMI.  Physical Exam   {Exam List (Optional):219407}    {Diagnostic Test Results (Optional):566838}        Signed Electronically by: Echo Martínez PA-C  {Email feedback regarding this note to [email or web address]   :[id number]}  "

## 2024-08-07 PROCEDURE — 87507 IADNA-DNA/RNA PROBE TQ 12-25: CPT | Performed by: PHYSICIAN ASSISTANT

## 2024-08-07 PROCEDURE — 87177 OVA AND PARASITES SMEARS: CPT | Performed by: PHYSICIAN ASSISTANT

## 2024-08-07 PROCEDURE — 87209 SMEAR COMPLEX STAIN: CPT | Performed by: PHYSICIAN ASSISTANT

## 2024-08-08 ENCOUNTER — LAB (OUTPATIENT)
Dept: LAB | Facility: CLINIC | Age: 64
End: 2024-08-08
Payer: COMMERCIAL

## 2024-08-08 ENCOUNTER — ONCOLOGY VISIT (OUTPATIENT)
Dept: ONCOLOGY | Facility: CLINIC | Age: 64
End: 2024-08-08
Payer: COMMERCIAL

## 2024-08-08 VITALS
WEIGHT: 180.1 LBS | TEMPERATURE: 98.8 F | RESPIRATION RATE: 16 BRPM | DIASTOLIC BLOOD PRESSURE: 80 MMHG | SYSTOLIC BLOOD PRESSURE: 125 MMHG | BODY MASS INDEX: 26.6 KG/M2 | OXYGEN SATURATION: 98 % | HEART RATE: 69 BPM

## 2024-08-08 DIAGNOSIS — Z80.3 FAMILY HISTORY OF MALIGNANT NEOPLASM OF BREAST: ICD-10-CM

## 2024-08-08 DIAGNOSIS — Z87.42 HISTORY OF ATYPICAL HYPERPLASIA OF BREAST: Primary | ICD-10-CM

## 2024-08-08 DIAGNOSIS — R10.10 UPPER ABDOMINAL PAIN: ICD-10-CM

## 2024-08-08 LAB
ADV 40+41 DNA STL QL NAA+NON-PROBE: NEGATIVE
ASTRO TYP 1-8 RNA STL QL NAA+NON-PROBE: NEGATIVE
C CAYETANENSIS DNA STL QL NAA+NON-PROBE: NEGATIVE
CAMPYLOBACTER DNA SPEC NAA+PROBE: NEGATIVE
CRYPTOSP DNA STL QL NAA+NON-PROBE: NEGATIVE
E COLI O157 DNA STL QL NAA+NON-PROBE: NORMAL
E HISTOLYT DNA STL QL NAA+NON-PROBE: NEGATIVE
EAEC ASTA GENE ISLT QL NAA+PROBE: NEGATIVE
EC STX1+STX2 GENES STL QL NAA+NON-PROBE: NEGATIVE
EPEC EAE GENE STL QL NAA+NON-PROBE: NEGATIVE
ETEC LTA+ST1A+ST1B TOX ST NAA+NON-PROBE: NEGATIVE
G LAMBLIA DNA STL QL NAA+NON-PROBE: NEGATIVE
NOROVIRUS GI+II RNA STL QL NAA+NON-PROBE: NEGATIVE
P SHIGELLOIDES DNA STL QL NAA+NON-PROBE: NEGATIVE
RVA RNA STL QL NAA+NON-PROBE: NEGATIVE
SALMONELLA SP RPOD STL QL NAA+PROBE: NEGATIVE
SAPO I+II+IV+V RNA STL QL NAA+NON-PROBE: NEGATIVE
SHIGELLA SP+EIEC IPAH ST NAA+NON-PROBE: NEGATIVE
V CHOLERAE DNA SPEC QL NAA+PROBE: NEGATIVE
VIBRIO DNA SPEC NAA+PROBE: NEGATIVE
Y ENTEROCOL DNA STL QL NAA+PROBE: NEGATIVE

## 2024-08-08 PROCEDURE — 99213 OFFICE O/P EST LOW 20 MIN: CPT

## 2024-08-08 PROCEDURE — 99214 OFFICE O/P EST MOD 30 MIN: CPT

## 2024-08-08 ASSESSMENT — PAIN SCALES - GENERAL: PAINLEVEL: NO PAIN (0)

## 2024-08-08 NOTE — NURSING NOTE
"Oncology Rooming Note    August 8, 2024 1:05 PM   Brianda Payton is a 64 year old female who presents for:    Chief Complaint   Patient presents with    Oncology Clinic Visit     History of breast cancer     Initial Vitals: /80 (BP Location: Right arm, Patient Position: Sitting, Cuff Size: Adult Regular)   Pulse 69   Temp 98.8  F (37.1  C) (Oral)   Resp 16   Wt 81.7 kg (180 lb 1.6 oz)   LMP 09/27/1997   SpO2 98%   BMI 26.60 kg/m   Estimated body mass index is 26.6 kg/m  as calculated from the following:    Height as of 8/6/24: 1.753 m (5' 9\").    Weight as of this encounter: 81.7 kg (180 lb 1.6 oz). Body surface area is 1.99 meters squared.  No Pain (0) Comment: Data Unavailable   Patient's last menstrual period was 09/27/1997.  Allergies reviewed: Yes  Medications reviewed: Yes    Medications: MEDICATION REFILLS NEEDED TODAY. Provider was notified.  Pharmacy name entered into [facility]:    [id number] - [place], [place] - [address]  [id number] - [place], [place] - [address]    Frailty Screening:   Is the patient here for a new oncology consult visit in cancer care? 2. No      Clinical concerns: Patient has been sick with gastrointestinal issues for the whole month of July. She woke up in the middle of the night freezing, despite her  saying that she felt hot. Patient continued to have a fever, tested negative for COVID-19 and strep throat. Patient continued to have high fever and headache. She then had constipation for 5-6 days, so she took senna and magnesium for three days until she could go to the bathroom. She also had acid reflux and had to sleep sitting up. There were a few days when the acid reflux was so bad that she would violently throw up. Patient could barely eat and mostly drank clear liquids. She tried massaging her abdomen and doing yoga. Patient had diarrhea for three days afterward and felt really weak. Visited " a diagnostic clinic a few days ago, might see a gastrointestinal specialist. Stools have become a bit more normal the past few days.  [facility] was notified.      Bruce Thorne EMT

## 2024-08-08 NOTE — LETTER
2024      Brianda Payton  [address] [address] [place] [facility] [address]      Dear Colleague,    Thank you for referring your patient, Brianda Payton, to the [facility]. Please see a copy of my visit note below.    Oncology Risk Management Consultation:  Date on this visit: 2024    Brianda Payton requires high risk screening and surveillance to reduce her risk of cancer secondary to  atypical ductal hyperplasia, for which she had an excisional biopsy in . She is considered to have a lifetime risk for breast cancer of up to 35%.      Primary Physician: Joseph Grijalva MD     History Of Present Illness:  Ms. Payton is a very pleasant, healthy 64 year old female who presents with a history of atypical ductal hyperplasia.     Pertinent history:  History of breastfeeding about 15 months (2 children 6-7 months each)  S/P  hysterectomy  (age 36)  No hx of HRT  Breast density: almost entirely fat   she underwent bilateral breast reduction.  (Bright Reinoso MD, plastic surgeon)     Breast biopsies:   10/29/2007- STEREOTACTIC RIGHT BREAST CORE BIOPSY - PATHOLOGY RESULTS: Histologic evaluations of the biopsy specimens   shows features of fibrocystic changes with intraductal hyperplasia and flat epithelial atypia. No evidence of malignancy or calcifications.   There were, however, calcifications present in the biopsy specimen. The pathologist thinks that these probably could be lost during the  pathology processing.      2007- Excisional biopsy (lumpectomy, right breast): FINAL DIAGNOSIS:  Right breast, lumpectomy, showin.  Focal atypical ductal hyperplasia (11 mm from the superior surface).        2.  Multifocal flat epithelial atypia (closest area 1.1 mm from the inferior margin).        3.  Columnar cell change and multifocal columnar cell hyperplasia.        4.  Duct ectasia.        5.  No calcifications seen.        6.  No evidence of malignancy.        7.  Margins  clear of flat epithelial atypia or atypical ductal hyperplasia     8/21/2008- Two left breast ultrasound guided core needle biopsies: 1. 2:00 position, 5 cm from nipple and 2. 5:00 position, 11 cm from nipple, posteriorly; both benign, no atypia or malignancy.      Skin biopsies:  5/19/2014- Skin upper inner aspect right upper arm, lesion, biopsy-  Hyperkeratotic skin, cannot rule out actinic keratosis  2/12/2016- FINAL DIAGNOSIS:Skin, left frontal scalp (OSC [id number] obtained   02/12/2016:- Perifolliculitis with follicular plugging and fibrosis      Genetic testing:  None to date.     Recent screening history:  12/23/2019- Breast MRI, [email or web address]  9/30/2020- Screening tomosynthesis mammogram, [email or web address]  8/12/2022- Breast MRI, Right Breast: BI-RADS CATEGORY: 2 - Benign Finding(s). Left Breast: BI-RADS CATEGORY: 1 -  NEGATIVE.   8/17/2023: Screening tomosynthesis mammogram, [email or web address]  12/23/2023: Breast MRI, [email or web address]       At this visit, she denies new fatigue, breast asymmetry, lumps, masses, thickening, nipple discharge and skin changes in her breasts.    Past Medical/Surgical History:  Past Medical History:   Diagnosis Date     Atypical ductal hyperplasia of breast 10/29/2007     Benign thyroid cyst      Menopause 1997     Past Surgical History:   Procedure Laterality Date     HC EXCISION BREAST LESION, OPEN >=1  01/01/2007    Right breast     JOINT REPLACEMENT Right      MAMMOPLASTY REDUCTION  01/01/2005      ASPIRATION THYROID CYST       ZZC TOTAL ABDOM HYSTERECTOMY  01/01/1997    large fibroid uterus.  ovaries retained         Allergies:  Allergies as of 08/08/2024 - Reviewed 08/08/2024   Allergen Reaction Noted     Seasonal allergies Other (See Comments) 09/27/2011       Current Medications:  Current Outpatient Medications   Medication Sig Dispense Refill     ARMOUR THYROID 30 MG tablet Take 1 tablet (30 mg) by mouth daily at 2 pm 90 tablet 3     magnesium 250 MG tablet Take 1 tablet by mouth daily       EPINEPHrine  (ANY BX GENERIC EQUIV) 0.3 MG/0.3ML injection 2-pack Inject 0.3 mLs (0.3 mg) into the muscle as needed for anaphylaxis May repeat one time in 5-15 minutes if response to initial dose is inadequate. (Patient not taking: Reported on 2024) 2 each 0        Family History:  Family History   Problem Relation Age of Onset     Heart Disease Mother         9 stents     C.A.D. Mother      Hypertension Mother      Lipids Mother      Circulatory Father      Thyroid Cancer Father 60     Depression Father      Neuropathy Father      Melanoma Sister      Thyroid Cancer Sister 58     Skin Cancer Sister      Basal cell carcinoma Sister      Other - See Comments Sister         3 cavernous brain bleeds     Brain Tumor Sister 52        glioblastoma,  at 54     Mental Illness Brother      C.A.D. Maternal Grandmother      Hypertension Maternal Grandmother      Arthritis Maternal Grandmother      Heart Disease Maternal Grandmother         cause of death     Lipids Maternal Grandfather      Muscular Dystrophy Maternal Grandfather          in 50s     Heart Disease Maternal Grandfather          of myocardial infarction     Breast Cancer Paternal Grandmother 85        cause of death     Genitourinary Problems Paternal Grandmother      Gynecology Paternal Grandmother          around 95     Osteoporosis Paternal Grandmother      C.A.D. Paternal Grandfather      Prostate Cancer Paternal Grandfather 80         at 93     Alzheimer Disease Paternal Grandfather      Asthma Paternal Grandfather      Heart Disease Paternal Grandfather      Diabetes Paternal Great-Grandmother        Social History:  Social History     Socioeconomic History     Marital status:      Spouse name: Frankie Key     Number of children: 2     Years of education: Not on file     Highest education level: Not on file   Occupational History     Employer: SELF EMPLOYED.   Tobacco Use     Smoking status: Never     Smokeless tobacco: Never      Tobacco comments:     never smoked   Substance and Sexual Activity     Alcohol use: Yes     Comment: about 4x/week, 1-2 glasses of wine     Drug use: No     Sexual activity: Yes     Partners: Male   Other Topics Concern     Parent/sibling w/ CABG, MI or angioplasty before 65F 55M? Not Asked   Social History Narrative     Not on file     Social Determinants of Health     Financial Resource Strain: Not on file   Food Insecurity: Not on file   Transportation Needs: Not on file   Physical Activity: Not on file   Stress: Not on file   Social Connections: Unknown (8/30/2022)    [facility]    Social Connections      Frequency of Communication with Friends and Family: Not on file   Interpersonal Safety: Not on file   Housing Stability: Not on file         Physical Exam:  /80 (BP Location: Right arm, Patient Position: Sitting, Cuff Size: Adult Regular)   Pulse 69   Temp 98.8  F (37.1  C) (Oral)   Resp 16   Wt 81.7 kg (180 lb 1.6 oz)   LMP 09/27/1997   SpO2 98%   BMI 26.60 kg/m    GENERAL APPEARANCE: healthy, alert and no distress  LYMPHATICS: No cervical, supraclavicular, or axillary lymphadenopathy  BREAST: A multipositional, bilateral breast exam was performed.  Fairly symmetrical, though left slightly greater than right. Nipples everted bilaterally. Right breast: no palpable dominant masses, no nipple discharge, no skin changes. Dense tissue.  Right axilla: no palpable adenopathy. Left breast: no palpable dominant masses, no nipple discharge, no skin changes. Left axilla: no palpable adenopathy. Dense tissue.  Well healed, bilateral scars from breast reduction.   SKIN: no suspicious lesions or rashes on examined skin    Laboratory/Imaging Studies  No results found for any visits on 08/08/24.    ASSESSMENT    Brianda reports that she is doing well at this visit. She has no concerns with her breast tissue, and no updates  to her family's medical history. She has been experiencing some significant GI symptoms over the past month, and has followed with primary care. She has had lab work and imaging and will make an appointment with the GI team when she is able. She also reports having a 15 pound weight loss over the past month. I encouraged her to follow-up with the GI team, even though her symptoms are improving. She requested that I print the results of her lab work today, as [facility]     We reviewed the results of her recent breast screening, all of which has been negative. We reviewed signs and symptoms to monitor for between visits, including any breast lumps, bumps, nipple discharge, nipple inversion, changes to the texture of the skin on the breasts that would look crinkled, puckered, or like the skin of an orange peel, or any other changes to the breast tissue. We will proceed with the plan below. I will see her back in one year.       INDIVIDUALIZED CANCER RISK MANAGEMENT PLAN:    Individualized Surveillance Plan for women  With 20% or greater lifetime risk of breast cancer   Per NCCN Breast Cancer Screening and Diagnosis Guidelines Version 2.2024   Recommended screening Test or procedure Last done Next Scheduled    Clinical encounter Clinical exam every 6-12 months.   Refer to genetic counseling if not already done.  Consider risk reduction strategies.   August 2024 August 2025   However, some family histories with breast cancers at a very young age, may warrant screening starting earlier.    *May begin at age 40 if breast cancers in the family occur at later ages.    Annual mammogram beginning 10 years younger than the earliest breast cancer in the family but not prior to age 30.    Recommend annual breast MRI to begin 10 years younger than the earliest breast cancer in the family but not prior to age 25.    Breast MRIs are preferably done on day 7-15 of the menstrual cycle in premenopausal  women.   See below   See below   Breast screening for patients at high risk due to thoracic radiation between the ages of 10-30   Annual clinical exam beginning 8 years after radiation therapy.    Annual screening mammogram beginning at age 30 or 8 years after radiation therapy    Annual breast MRI, beginning at age 25 or 8 years after radiation therapy.     See below   See below   Women who have a lifetime risk of >20% based on history of LCIS or ADH/ALH Annual screening mammogram beginning at age of LCIS or ADH/ALH but not prior to age 30.    Consider annual MRI to begin at age of diagnosis of LCIS or ADH/ALH but not prior to age 25.    Recommend risk reducing strategies if possible.   8/17/2023: Screening tomosynthesis mammogram, [email or web address]    12/23/2023: Breast MRI, BiRads1   Mammogram scheduled for 8/26    Breast MRI in February 2025    Return to clinic in August 2025 with a mammogram following our visit    Recommend risk reducing strategies for women with 1.7% 5 year risk of breast cancer.           I spent a total of 30 minutes on the day of the visit. Please see the note for further information on patient assessment and treatment.     Adalgisa Staples DNP, ALOK, [facility]  Clinical Nurse Specialist  Cancer Risk Management Program  [facility]    Cc:  Joseph Grijalva MD            Again, thank you for allowing me to participate in the care of your patient.        Sincerely,        ALOK Ramirez CNP

## 2024-08-08 NOTE — PATIENT INSTRUCTIONS
Individualized Surveillance Plan for women  With 20% or greater lifetime risk of breast cancer   Per NCCN Breast Cancer Screening and Diagnosis Guidelines Version 2.2024   Recommended screening Test or procedure Last done Next Scheduled    Clinical encounter Clinical exam every 6-12 months.   Refer to genetic counseling if not already done.  Consider risk reduction strategies.   August 2024 August 2025   However, some family histories with breast cancers at a very young age, may warrant screening starting earlier.    *May begin at age 40 if breast cancers in the family occur at later ages.    Annual mammogram beginning 10 years younger than the earliest breast cancer in the family but not prior to age 30.    Recommend annual breast MRI to begin 10 years younger than the earliest breast cancer in the family but not prior to age 25.    Breast MRIs are preferably done on day 7-15 of the menstrual cycle in premenopausal women.   See below   See below   Breast screening for patients at high risk due to thoracic radiation between the ages of 10-30   Annual clinical exam beginning 8 years after radiation therapy.    Annual screening mammogram beginning at age 30 or 8 years after radiation therapy    Annual breast MRI, beginning at age 25 or 8 years after radiation therapy.     See below   See below   Women who have a lifetime risk of >20% based on history of LCIS or ADH/ALH Annual screening mammogram beginning at age of LCIS or ADH/ALH but not prior to age 30.    Consider annual MRI to begin at age of diagnosis of LCIS or ADH/ALH but not prior to age 25.    Recommend risk reducing strategies if possible.   8/17/2023: Screening tomosynthesis mammogram, [email or web address]    12/23/2023: Breast MRI, BiRads1   Mammogram scheduled for 8/26    Breast MRI in February 2025    Return to clinic in August 2025 with a mammogram following our visit    Recommend risk reducing strategies for women with 1.7% 5 year risk of breast cancer.

## 2024-08-08 NOTE — PROGRESS NOTES
Oncology Risk Management Consultation:  Date on this visit: 2024    Brianda Payton requires high risk screening and surveillance to reduce her risk of cancer secondary to  atypical ductal hyperplasia, for which she had an excisional biopsy in . She is considered to have a lifetime risk for breast cancer of up to 35%.      Primary Physician: Joseph Grijalva MD     History Of Present Illness:  Ms. Payton is a very pleasant, healthy 64 year old female who presents with a history of atypical ductal hyperplasia.     Pertinent history:  History of breastfeeding about 15 months (2 children 6-7 months each)  S/P  hysterectomy  (age 36)  No hx of HRT  Breast density: almost entirely fat   she underwent bilateral breast reduction.  (Bright Reinoso MD, plastic surgeon)     Breast biopsies:   10/29/2007- STEREOTACTIC RIGHT BREAST CORE BIOPSY - PATHOLOGY RESULTS: Histologic evaluations of the biopsy specimens   shows features of fibrocystic changes with intraductal hyperplasia and flat epithelial atypia. No evidence of malignancy or calcifications.   There were, however, calcifications present in the biopsy specimen. The pathologist thinks that these probably could be lost during the  pathology processing.      2007- Excisional biopsy (lumpectomy, right breast): FINAL DIAGNOSIS:  Right breast, lumpectomy, showin.  Focal atypical ductal hyperplasia (11 mm from the superior surface).        2.  Multifocal flat epithelial atypia (closest area 1.1 mm from the inferior margin).        3.  Columnar cell change and multifocal columnar cell hyperplasia.        4.  Duct ectasia.        5.  No calcifications seen.        6.  No evidence of malignancy.        7.  Margins clear of flat epithelial atypia or atypical ductal hyperplasia     2008- Two left breast ultrasound guided core needle biopsies: 1. 2:00 position, 5 cm from nipple and 2. 5:00 position, 11 cm from nipple, posteriorly; both benign, no  atypia or malignancy.      Skin biopsies:  5/19/2014- Skin upper inner aspect right upper arm, lesion, biopsy-  Hyperkeratotic skin, cannot rule out actinic keratosis  2/12/2016- FINAL DIAGNOSIS:Skin, left frontal scalp (OSC [id number] obtained   02/12/2016:- Perifolliculitis with follicular plugging and fibrosis      Genetic testing:  None to date.     Recent screening history:  12/23/2019- Breast MRI, [email or web address]  9/30/2020- Screening tomosynthesis mammogram, BiRads1  8/12/2022- Breast MRI, Right Breast: BI-RADS CATEGORY: 2 - Benign Finding(s). Left Breast: BI-RADS CATEGORY: 1 -  NEGATIVE.   8/17/2023: Screening tomosynthesis mammogram, BiRads2  12/23/2023: Breast MRI, BiRads1       At this visit, she denies new fatigue, breast asymmetry, lumps, masses, thickening, nipple discharge and skin changes in her breasts.    Past Medical/Surgical History:  Past Medical History:   Diagnosis Date    Atypical ductal hyperplasia of breast 10/29/2007    Benign thyroid cyst     Menopause 1997     Past Surgical History:   Procedure Laterality Date    HC EXCISION BREAST LESION, OPEN >=1  01/01/2007    Right breast    JOINT REPLACEMENT Right     MAMMOPLASTY REDUCTION  01/01/2005     ASPIRATION THYROID CYST      ZZC TOTAL ABDOM HYSTERECTOMY  01/01/1997    large fibroid uterus.  ovaries retained         Allergies:  Allergies as of 08/08/2024 - Reviewed 08/08/2024   Allergen Reaction Noted    Seasonal allergies Other (See Comments) 09/27/2011       Current Medications:  Current Outpatient Medications   Medication Sig Dispense Refill    ARMOUR THYROID 30 MG tablet Take 1 tablet (30 mg) by mouth daily at 2 pm 90 tablet 3    magnesium 250 MG tablet Take 1 tablet by mouth daily      EPINEPHrine (ANY BX GENERIC EQUIV) 0.3 MG/0.3ML injection 2-pack Inject 0.3 mLs (0.3 mg) into the muscle as needed for anaphylaxis May repeat one time in 5-15 minutes if response to initial dose is inadequate. (Patient not taking: Reported on 6/18/2024) 2 each 0         Family History:  Family History   Problem Relation Age of Onset    Heart Disease Mother         9 stents    C.A.D. Mother     Hypertension Mother     Lipids Mother     Circulatory Father     Thyroid Cancer Father 60    Depression Father     Neuropathy Father     Melanoma Sister     Thyroid Cancer Sister 58    Skin Cancer Sister     Basal cell carcinoma Sister     Other - See Comments Sister         3 cavernous brain bleeds    Brain Tumor Sister 52        glioblastoma,  at 54    Mental Illness Brother     C.A.D. Maternal Grandmother     Hypertension Maternal Grandmother     Arthritis Maternal Grandmother     Heart Disease Maternal Grandmother         cause of death    Lipids Maternal Grandfather     Muscular Dystrophy Maternal Grandfather          in 50s    Heart Disease Maternal Grandfather          of myocardial infarction    Breast Cancer Paternal Grandmother 85        cause of death    Genitourinary Problems Paternal Grandmother     Gynecology Paternal Grandmother          around 95    Osteoporosis Paternal Grandmother     C.A.D. Paternal Grandfather     Prostate Cancer Paternal Grandfather 80         at 93    Alzheimer Disease Paternal Grandfather     Asthma Paternal Grandfather     Heart Disease Paternal Grandfather     Diabetes Paternal Great-Grandmother        Social History:  Social History     Socioeconomic History    Marital status:      Spouse name: Frankie Key    Number of children: 2    Years of education: Not on file    Highest education level: Not on file   Occupational History     Employer: SELF EMPLOYED.   Tobacco Use    Smoking status: Never    Smokeless tobacco: Never    Tobacco comments:     never smoked   Substance and Sexual Activity    Alcohol use: Yes     Comment: about 4x/week, 1-2 glasses of wine    Drug use: No    Sexual activity: Yes     Partners: Male   Other Topics Concern    Parent/sibling w/ CABG, MI or angioplasty before 65F 55M? Not Asked    Social History Narrative    Not on file     Social Determinants of Health     Financial Resource Strain: Not on file   Food Insecurity: Not on file   Transportation Needs: Not on file   Physical Activity: Not on file   Stress: Not on file   Social Connections: Unknown (8/30/2022)    [facility], [facility]    Social Connections     Frequency of Communication with Friends and Family: Not on file   Interpersonal Safety: Not on file   Housing Stability: Not on file         Physical Exam:  /80 (BP Location: Right arm, Patient Position: Sitting, Cuff Size: Adult Regular)   Pulse 69   Temp 98.8  F (37.1  C) (Oral)   Resp 16   Wt 81.7 kg (180 lb 1.6 oz)   LMP 09/27/1997   SpO2 98%   BMI 26.60 kg/m    GENERAL APPEARANCE: healthy, alert and no distress  LYMPHATICS: No cervical, supraclavicular, or axillary lymphadenopathy  BREAST: A multipositional, bilateral breast exam was performed.  Fairly symmetrical, though left slightly greater than right. Nipples everted bilaterally. Right breast: no palpable dominant masses, no nipple discharge, no skin changes. Dense tissue.  Right axilla: no palpable adenopathy. Left breast: no palpable dominant masses, no nipple discharge, no skin changes. Left axilla: no palpable adenopathy. Dense tissue.  Well healed, bilateral scars from breast reduction.   SKIN: no suspicious lesions or rashes on examined skin    Laboratory/Imaging Studies  No results found for any visits on 08/08/24.    ASSESSMENT    Brianda reports that she is doing well at this visit. She has no concerns with her breast tissue, and no updates to her family's medical history. She has been experiencing some significant GI symptoms over the past month, and has followed with primary care. She has had lab work and imaging and will make an appointment with the GI team when she is able. She also reports having a 15 pound weight loss over  the past month. I encouraged her to follow-up with the GI team, even though her symptoms are improving. She requested that I print the results of her lab work today, as [facility]     We reviewed the results of her recent breast screening, all of which has been negative. We reviewed signs and symptoms to monitor for between visits, including any breast lumps, bumps, nipple discharge, nipple inversion, changes to the texture of the skin on the breasts that would look crinkled, puckered, or like the skin of an orange peel, or any other changes to the breast tissue. We will proceed with the plan below. I will see her back in one year.       INDIVIDUALIZED CANCER RISK MANAGEMENT PLAN:    Individualized Surveillance Plan for women  With 20% or greater lifetime risk of breast cancer   Per NCCN Breast Cancer Screening and Diagnosis Guidelines Version 2.2024   Recommended screening Test or procedure Last done Next Scheduled    Clinical encounter Clinical exam every 6-12 months.   Refer to genetic counseling if not already done.  Consider risk reduction strategies.   August 2024 August 2025   However, some family histories with breast cancers at a very young age, may warrant screening starting earlier.    *May begin at age 40 if breast cancers in the family occur at later ages.    Annual mammogram beginning 10 years younger than the earliest breast cancer in the family but not prior to age 30.    Recommend annual breast MRI to begin 10 years younger than the earliest breast cancer in the family but not prior to age 25.    Breast MRIs are preferably done on day 7-15 of the menstrual cycle in premenopausal women.   See below   See below   Breast screening for patients at high risk due to thoracic radiation between the ages of 10-30   Annual clinical exam beginning 8 years after radiation therapy.    Annual screening mammogram beginning at age 30 or 8 years after radiation therapy    Annual  breast MRI, beginning at age 25 or 8 years after radiation therapy.     See below   See below   Women who have a lifetime risk of >20% based on history of LCIS or ADH/ALH Annual screening mammogram beginning at age of LCIS or ADH/ALH but not prior to age 30.    Consider annual MRI to begin at age of diagnosis of LCIS or ADH/ALH but not prior to age 25.    Recommend risk reducing strategies if possible.   8/17/2023: Screening tomosynthesis mammogram, [email or web address]    12/23/2023: Breast MRI, BiRads1   Mammogram scheduled for 8/26    Breast MRI in February 2025    Return to clinic in August 2025 with a mammogram following our visit    Recommend risk reducing strategies for women with 1.7% 5 year risk of breast cancer.           I spent a total of 30 minutes on the day of the visit. Please see the note for further information on patient assessment and treatment.     Adalgisa Staples, FARRAH, APRN, AGCNS-BC  Clinical Nurse Specialist  Cancer Risk Management Program  [facility]    Cc:  Joseph Grijalva MD

## 2024-08-09 LAB — O+P STL MICRO: NEGATIVE

## 2024-08-09 PROCEDURE — 87338 HPYLORI STOOL AG IA: CPT | Performed by: PHYSICIAN ASSISTANT

## 2024-08-09 PROCEDURE — 99000 SPECIMEN HANDLING OFFICE-LAB: CPT | Performed by: PATHOLOGY

## 2024-08-12 LAB — H PYLORI AG STL QL IA: NEGATIVE

## 2024-08-26 ENCOUNTER — HOSPITAL ENCOUNTER (OUTPATIENT)
Dept: MAMMOGRAPHY | Facility: CLINIC | Age: 64
Discharge: HOME | End: 2024-08-26
Payer: COMMERCIAL

## 2024-08-26 ENCOUNTER — OFFICE VISIT (OUTPATIENT)
Dept: FAMILY MEDICINE | Facility: CLINIC | Age: 64
End: 2024-08-26
Payer: COMMERCIAL

## 2024-08-26 VITALS
WEIGHT: 179.8 LBS | BODY MASS INDEX: 27.25 KG/M2 | TEMPERATURE: 98.2 F | DIASTOLIC BLOOD PRESSURE: 62 MMHG | SYSTOLIC BLOOD PRESSURE: 117 MMHG | HEIGHT: 68 IN | OXYGEN SATURATION: 96 % | HEART RATE: 74 BPM

## 2024-08-26 DIAGNOSIS — G47.33 OSA (OBSTRUCTIVE SLEEP APNEA): ICD-10-CM

## 2024-08-26 DIAGNOSIS — E78.5 HYPERLIPIDEMIA WITH TARGET LDL LESS THAN 100: ICD-10-CM

## 2024-08-26 DIAGNOSIS — E03.9 HYPOTHYROIDISM (ACQUIRED): ICD-10-CM

## 2024-08-26 DIAGNOSIS — N60.99 ATYPICAL DUCTAL HYPERPLASIA OF BREAST: ICD-10-CM

## 2024-08-26 DIAGNOSIS — Z87.42 HISTORY OF ATYPICAL HYPERPLASIA OF BREAST: ICD-10-CM

## 2024-08-26 DIAGNOSIS — T63.444A BEE STING REACTION, UNDETERMINED INTENT, INITIAL ENCOUNTER: ICD-10-CM

## 2024-08-26 DIAGNOSIS — K21.00 GASTROESOPHAGEAL REFLUX DISEASE WITH ESOPHAGITIS WITHOUT HEMORRHAGE: ICD-10-CM

## 2024-08-26 DIAGNOSIS — Z00.00 ENCOUNTER FOR ANNUAL WELLNESS VISIT (AWV) IN MEDICARE PATIENT: Primary | ICD-10-CM

## 2024-08-26 DIAGNOSIS — J30.2 SEASONAL ALLERGIC RHINITIS, UNSPECIFIED TRIGGER: ICD-10-CM

## 2024-08-26 DIAGNOSIS — R68.83 CHILLS: ICD-10-CM

## 2024-08-26 DIAGNOSIS — Z12.39 BREAST CANCER SCREENING, HIGH RISK PATIENT: ICD-10-CM

## 2024-08-26 DIAGNOSIS — R10.84 ABDOMINAL PAIN, GENERALIZED: ICD-10-CM

## 2024-08-26 DIAGNOSIS — R53.82 CHRONIC FATIGUE: ICD-10-CM

## 2024-08-26 DIAGNOSIS — E04.1 THYROID NODULE: ICD-10-CM

## 2024-08-26 LAB — TSH SERPL DL<=0.005 MIU/L-ACNC: 0.96 UIU/ML (ref 0.3–4.2)

## 2024-08-26 PROCEDURE — 36415 COLL VENOUS BLD VENIPUNCTURE: CPT | Performed by: INTERNAL MEDICINE

## 2024-08-26 PROCEDURE — 99214 OFFICE O/P EST MOD 30 MIN: CPT | Mod: 25 | Performed by: INTERNAL MEDICINE

## 2024-08-26 PROCEDURE — 99397 PER PM REEVAL EST PAT 65+ YR: CPT | Performed by: INTERNAL MEDICINE

## 2024-08-26 PROCEDURE — 84443 ASSAY THYROID STIM HORMONE: CPT | Performed by: INTERNAL MEDICINE

## 2024-08-26 PROCEDURE — 77063 BREAST TOMOSYNTHESIS BI: CPT

## 2024-08-26 RX ORDER — EPINEPHRINE 0.3 MG/.3ML
0.3 INJECTION SUBCUTANEOUS PRN
Qty: 2 EACH | Refills: 0 | Status: SHIPPED | OUTPATIENT
Start: 2024-08-26

## 2024-08-26 RX ORDER — THYROID 30 MG/1
30 TABLET ORAL
Qty: 90 TABLET | Refills: 3 | Status: SHIPPED | OUTPATIENT
Start: 2024-08-26

## 2024-08-26 SDOH — HEALTH STABILITY: PHYSICAL HEALTH: ON AVERAGE, HOW MANY DAYS PER WEEK DO YOU ENGAGE IN MODERATE TO STRENUOUS EXERCISE (LIKE A BRISK WALK)?: 5 DAYS

## 2024-08-26 SDOH — HEALTH STABILITY: PHYSICAL HEALTH: ON AVERAGE, HOW MANY MINUTES DO YOU ENGAGE IN EXERCISE AT THIS LEVEL?: 60 MIN

## 2024-08-26 ASSESSMENT — SOCIAL DETERMINANTS OF HEALTH (SDOH): HOW OFTEN DO YOU GET TOGETHER WITH FRIENDS OR RELATIVES?: MORE THAN THREE TIMES A WEEK

## 2024-08-26 ASSESSMENT — PAIN SCALES - GENERAL: PAINLEVEL: NO PAIN (0)

## 2024-08-26 NOTE — PROGRESS NOTES
Preventive Care Visit  [facility] AUGUSTO Grijalva MD, Internal Medicine  Aug 26, 2024      Assessment & Plan     Encounter for annual wellness visit (AWV) in Medicare patient  64 years old woman who will do mammogram today she is up to date on colon exam and does not need Pap smear    She will take COVID booster when available  RSV vaccine not needed until she is 15 years old  Chills  If she has recurrent chills we will check further  At present I will check her gallbladder   - US Abdomen Limited    Abdominal pain, generalized  we will try 4 weeks of Prilosec**  - omeprazole (PRILOSEC) 20 MG DR capsule  Dispense: 31 capsule; Refill: 0  - US Abdomen Limited    Gastroesophageal reflux disease with esophagitis without hemorrhage  H. pylori is negative  - omeprazole (PRILOSEC) 20 MG DR capsule  Dispense: 31 capsule; Refill: 0    Hypothyroidism (acquired)  We will check a TSH  - REVIEW OF HEALTH MAINTENANCE PROTOCOL ORDERS  - TSH WITH FREE T4 REFLEX  - ARMOUR THYROID 30 MG tablet  Dispense: 90 tablet; Refill: 3    Thyroid nodule  Left-sided nodule is 1 cm and has been biopsied  Right-sided ones are low-grade and subcentimeter  She has family history of thyroid cancer so we will check in 5 years in 2027  Hyperlipidemia with target LDL less than 100  We will try Zetia because of statin intolerance  We might need to try Zetia prior to that  - REVIEW OF HEALTH MAINTENANCE PROTOCOL ORDERS  - evolocumab (REPATHA) 140 MG/ML prefilled autoinjector  Dispense: 6 mL; Refill: 3    History of atypical hyperplasia of breast  Annual mammogram    JACKIE (obstructive sleep apnea)      Chronic fatigue    - REVIEW OF HEALTH MAINTENANCE PROTOCOL ORDERS    Seasonal allergic rhinitis, unspecified trigger  - EPINEPHrine (ANY BX GENERIC EQUIV) 0.3 MG/0.3ML injection 2-pack  Dispense: 2 each; Refill: 0    Bee sting reaction, undetermined intent, initial encounter    - EPINEPHrine (ANY BX GENERIC EQUIV) 0.3 MG/0.3ML injection  "2-pack  Dispense: 2 each; Refill: 0              BMI  Estimated body mass index is 27.16 kg/m  as calculated from the following:    Height as of this encounter: 1.733 m (5' 8.23\").    Weight as of this encounter: 81.6 kg (179 lb 12.8 oz).       Counseling  Appropriate preventive services were addressed with this patient via screening, questionnaire, or discussion as appropriate for fall prevention, nutrition, physical activity, Tobacco-use cessation, social engagement, weight loss and cognition.  Checklist reviewing preventive services available has been given to the patient.  Reviewed patient's diet, addressing concerns and/or questions.   She is at risk for psychosocial distress and has been provided with information to reduce risk.           Noni Lamar is a 64 year old, presenting for the following:  Physical (Not on Medicare Part B yet )         Health Care Directive  Patient does not have a Health Care Directive or Living Will: Patient states has Advance Directive and will bring in a copy to clinic.    HPI  This is a very nice 64 years old woman who lives in [facility] for her 's job  She comes to [place] often    Few weeks ago she was in a hotel room where she got chills for many hours  She had to get into hot shower  She was also constipated and did a colon cleanse  Since that time she does have epigastric pain        She does not have fever or chills anymore      8/26/2024   General Health   How would you rate your overall physical health? Good   Feel stress (tense, anxious, or unable to sleep) Only a little      (!) STRESS CONCERN      8/26/2024   Nutrition   Diet: Regular (no restrictions)    Gluten-free/reduced       Multiple values from one day are sorted in reverse-chronological order         8/26/2024   Exercise   Days per week of moderate/strenous exercise 5 days   Average minutes spent exercising at this level 60 min            8/26/2024   Social Factors   Frequency of gathering " with friends or relatives More than three times a week   Worry food won't last until get money to buy more No   Food not last or not have enough money for food? No   Do you have housing? (Housing is defined as stable permanent housing and does not include staying ouside in a car, in a tent, in an abandoned building, in an overnight shelter, or couch-surfing.) Yes   Are you worried about losing your housing? No   Lack of transportation? No   Unable to get utilities (heat,electricity)? No            8/26/2024   Fall Risk   Fallen 2 or more times in the past year? No    No   Trouble with walking or balance? No    No       Multiple values from one day are sorted in reverse-chronological order          8/26/2024   Activities of Daily Living- Home Safety   Needs help with the following daily activites None of the above   Safety concerns in the home None of the above            8/26/2024   Dental   Dentist two times every year? Yes            8/26/2024   Hearing Screening   Hearing concerns? None of the above            8/26/2024   Driving Risk Screening   Patient/family members have concerns about driving No            8/26/2024   General Alertness/Fatigue Screening   Have you been more tired than usual lately? No            8/26/2024   Urinary Incontinence Screening   Bothered by leaking urine in past 6 months No            8/26/2024   TB Screening   Were you born outside of the [place]? Yes            Today's PHQ-2 Score:       8/26/2024     9:05 AM   PHQ-2 ( 1999 Pfizer)   Q1: Little interest or pleasure in doing things 0   Q2: Feeling down, depressed or hopeless 0   PHQ-2 Score 0   Q1: Little interest or pleasure in doing things Not at all   Q2: Feeling down, depressed or hopeless Not at all   PHQ-2 Score 0           8/26/2024   Substance Use   Alcohol more than 3/day or more than 7/wk No   Do you have a current opioid prescription? No   How severe/bad is pain from 1 to 10? 1/10   Do you use any other substances  recreationally? No        Social History     Tobacco Use    Smoking status: Never    Smokeless tobacco: Never    Tobacco comments:     never smoked   Substance Use Topics    Alcohol use: Yes     Comment: about 4x/week, 1-2 glasses of wine    Drug use: No           8/17/2023   LAST FHS-7 RESULTS   1st degree relative breast or ovarian cancer No   Any relative bilateral breast cancer Yes   Any male have breast cancer No   Any ONE woman have BOTH breast AND ovarian cancer No   Any woman with breast cancer before 50yrs No   2 or more relatives with breast AND/OR ovarian cancer No   2 or more relatives with breast AND/OR bowel cancer Yes           Mammogram Screening - Mammogram every 1-2 years updated in Health Maintenance based on mutual decision making      History of abnormal Pap smear: No - age 65 or older with adequate negative prior screening test results (3 consecutive negative cytology results, 2 consecutive negative cotesting results, or 2 consecutive negative HrHPV test results within 10 years, with the most recent test occurring within the recommended screening interval for the test used)       ASCVD Risk   The 10-year ASCVD risk score (Tierney CROW, et al., 2019) is: 6.1%    Values used to calculate the score:      Age: 64 years      Sex: Female      Is Non- : No      Diabetic: No      Tobacco smoker: No      Systolic Blood Pressure: 117 mmHg      Is BP treated: No      HDL Cholesterol: 56 mg/dL      Total Cholesterol: 279 mg/dL            Reviewed and updated as needed this visit by Provider     Meds  Problems    Fam Hx            BP Readings from Last 3 Encounters:   08/26/24 117/62   08/08/24 125/80   08/06/24 118/78    Wt Readings from Last 3 Encounters:   08/26/24 81.6 kg (179 lb 12.8 oz)   08/08/24 81.7 kg (180 lb 1.6 oz)   08/06/24 81.8 kg (180 lb 4.8 oz)                  Patient Active Problem List   Diagnosis    Atypical ductal hyperplasia of breast    Perimenopausal  vasomotor symptoms    Thyroid nodule    Benign neoplasm of colon    Fatigue    Hyperlipidemia LDL goal <160    Hypothyroidism    Thunderclap headache    JACKIE (obstructive sleep apnea)    Hypothyroidism (acquired)    Weight gain    Skin lesion    Vitamin D deficiency    Headache(784.0)    Allergic rhinitis, unspecified allergic rhinitis type    BMI 28.0-28.9,adult    HX: breast cancer    Cervical radiculopathy    Diverticular disease of colon    History of colonic polyps    Vaginal atrophy     Past Surgical History:   Procedure Laterality Date    HC EXCISION BREAST LESION, OPEN >=1  2007    Right breast    JOINT REPLACEMENT Right     MAMMOPLASTY REDUCTION  2005    US ASPIRATION THYROID CYST      ZZC TOTAL ABDOM HYSTERECTOMY  1997    large fibroid uterus.  ovaries retained         Social History     Tobacco Use    Smoking status: Never    Smokeless tobacco: Never    Tobacco comments:     never smoked   Substance Use Topics    Alcohol use: Yes     Comment: about 4x/week, 1-2 glasses of wine     Family History   Problem Relation Age of Onset    Heart Disease Mother         9 stents    C.A.D. Mother     Hypertension Mother     Lipids Mother     Osteoporosis Mother     Circulatory Father     Thyroid Cancer Father 60    Depression Father     Neuropathy Father     Melanoma Sister     Thyroid Cancer Sister 58    Skin Cancer Sister     Basal cell carcinoma Sister     Other - See Comments Sister         3 cavernous brain bleeds    Brain Tumor Sister 52        glioblastoma,  at 54    Mental Illness Brother     C.A.D. Maternal Grandmother     Hypertension Maternal Grandmother     Arthritis Maternal Grandmother     Heart Disease Maternal Grandmother         cause of death    Lipids Maternal Grandfather     Muscular Dystrophy Maternal Grandfather          in 50s    Heart Disease Maternal Grandfather          of myocardial infarction    Breast Cancer Paternal Grandmother 75        cause of death     Genitourinary Problems Paternal Grandmother     Gynecology Paternal Grandmother          around 95    Osteoporosis Paternal Grandmother     C.A.D. Paternal Grandfather     Prostate Cancer Paternal Grandfather 80         at 93    Alzheimer Disease Paternal Grandfather     Asthma Paternal Grandfather     Heart Disease Paternal Grandfather     Diabetes Paternal Great-Grandmother          Current Outpatient Medications   Medication Sig Dispense Refill    ARMOUR THYROID 30 MG tablet Take 1 tablet (30 mg) by mouth daily at 2 pm. 90 tablet 3    EPINEPHrine (ANY BX GENERIC EQUIV) 0.3 MG/0.3ML injection 2-pack Inject 0.3 mLs (0.3 mg) into the muscle as needed for anaphylaxis. May repeat one time in 5-15 minutes if response to initial dose is inadequate. 2 each 0    evolocumab (REPATHA) 140 MG/ML prefilled autoinjector Inject 1 mL (140 mg) subcutaneously every 14 days. 6 mL 3    magnesium 250 MG tablet Take 1 tablet by mouth daily      omeprazole (PRILOSEC) 20 MG DR capsule Take 1 capsule (20 mg) by mouth daily. 31 capsule 0     Allergies   Allergen Reactions    Seasonal Allergies Other (See Comments)     Nasal stuffiness     Current providers sharing in care for this patient include:  Patient Care Team:  Joseph Grijalva MD as PCP - General (Internal Medicine)  Ced Medina MD as MD (Cardiovascular Disease)  Joseph Grijalva MD as Assigned PCP  Goltz, Bennett Ezra, PA-C as Assigned Neuroscience Provider  Adalgisa Staples APRN CNP as Assigned Cancer Care Provider    The following health maintenance items are reviewed in Epic and correct as of today:  Health Maintenance   Topic Date Due    RSV VACCINE (Pregnancy & 60+) (1 - 1-dose 60+ series) Never done    COVID-19 Vaccine (2023- season) 2023    TSH W/FREE T4 REFLEX  2024    MAMMO SCREENING  2024    INFLUENZA VACCINE (1) 2024    LIPID  2025    MEDICARE ANNUAL WELLNESS VISIT  2025    ANNUAL REVIEW OF HM ORDERS   "08/26/2025    FALL RISK ASSESSMENT  08/26/2025    COLORECTAL CANCER SCREENING  08/11/2026    DEXA  08/30/2026    GLUCOSE  08/06/2027    ADVANCE CARE PLANNING  08/26/2029    DTAP/TDAP/TD IMMUNIZATION (5 - Td or Tdap) 08/31/2032    HEPATITIS C SCREENING  Completed    PHQ-2 (once per calendar year)  Completed    Pneumococcal Vaccine: 65+ Years  Completed    ZOSTER IMMUNIZATION  Completed    HPV IMMUNIZATION  Aged Out    MENINGITIS IMMUNIZATION  Aged Out    RSV MONOCLONAL ANTIBODY  Aged Out         Review of Systems  Constitutional, HEENT, cardiovascular, pulmonary, GI, , musculoskeletal, neuro, skin, endocrine and psych systems are negative, except as otherwise noted.     Objective    Exam  /62   Pulse 74   Temp 98.2  F (36.8  C) (Temporal)   Ht 1.733 m (5' 8.23\")   Wt 81.6 kg (179 lb 12.8 oz)   LMP 09/27/1997   SpO2 96%   BMI 27.16 kg/m     Estimated body mass index is 27.16 kg/m  as calculated from the following:    Height as of this encounter: 1.733 m (5' 8.23\").    Weight as of this encounter: 81.6 kg (179 lb 12.8 oz).    Physical Exam  GENERAL: alert and no distress  EYES: Eyes grossly normal to inspection, PERRL and conjunctivae and sclerae normal  HENT: ear canals and TM's normal, nose and mouth without ulcers or lesions  NECK: no adenopathy, no asymmetry, masses, or scars  RESP: lungs clear to auscultation - no rales, rhonchi or wheezes  BREAST: normal without masses, tenderness or nipple discharge, no palpable axillary masses or adenopathy, and bilateral scarring from breast reduction  CV: regular rate and rhythm, normal S1 S2, no S3 or S4, no murmur, click or rub, no peripheral edema  ABDOMEN: soft, nontender, no hepatosplenomegaly, no masses and bowel sounds normal  MS: no gross musculoskeletal defects noted, no edema  SKIN: no suspicious lesions or rashes  NEURO: Normal strength and tone, mentation intact and speech normal  PSYCH: mentation appears normal, affect normal/bright         " 8/26/2024   Mini Cog   Clock Draw Score 2 Normal   3 Item Recall 3 objects recalled   Mini Cog Total Score 5                Signed Electronically by: Joseph Grijalva MD

## 2024-08-27 ENCOUNTER — ANCILLARY PROCEDURE (OUTPATIENT)
Dept: ULTRASOUND IMAGING | Facility: CLINIC | Age: 64
End: 2024-08-27
Attending: INTERNAL MEDICINE
Payer: COMMERCIAL

## 2024-08-27 DIAGNOSIS — R10.84 ABDOMINAL PAIN, GENERALIZED: ICD-10-CM

## 2024-08-27 DIAGNOSIS — R68.83 CHILLS: ICD-10-CM

## 2024-08-27 PROCEDURE — 76705 ECHO EXAM OF ABDOMEN: CPT

## 2024-09-04 NOTE — TELEPHONE ENCOUNTER
Outpatient Medication Detail     Disp Refills Start End MILAN   ARMMARK ANTHONY THYROID 30 MG tablet 90 tablet 3 8/26/2024 -- Yes   Sig - Route: Take 1 tablet (30 mg) by mouth daily at 2 pm. - Oral   Sent to pharmacy as: [place] Thyroid 30 MG Oral Tablet   Class: E-Prescribe   Order: [id number]   E-Prescribing Status: Receipt confirmed by pharmacy (8/26/2024  9:29 AM CDT)     Pharmacy    [id number] - [address]       Pharmacy seeking refill of armour thyroid.  Last Rx'd on 8- good for one year.    Too soon to renew.    Fax sent back to pharmacy - check records for Rx; not due to renew.      RT Amanda (R)

## 2024-10-03 DIAGNOSIS — R10.84 ABDOMINAL PAIN, GENERALIZED: ICD-10-CM

## 2024-10-03 DIAGNOSIS — K21.00 GASTROESOPHAGEAL REFLUX DISEASE WITH ESOPHAGITIS WITHOUT HEMORRHAGE: ICD-10-CM

## 2025-03-13 ENCOUNTER — LAB (OUTPATIENT)
Dept: LAB | Facility: CLINIC | Age: 65
End: 2025-03-13
Payer: COMMERCIAL

## 2025-03-13 DIAGNOSIS — E78.5 HYPERLIPIDEMIA WITH TARGET LDL LESS THAN 100: ICD-10-CM

## 2025-03-13 DIAGNOSIS — E03.9 ACQUIRED HYPOTHYROIDISM: ICD-10-CM

## 2025-03-13 LAB
ALT SERPL W P-5'-P-CCNC: 19 U/L (ref 0–50)
CHOLEST SERPL-MCNC: 163 MG/DL
FASTING STATUS PATIENT QL REPORTED: YES
HDLC SERPL-MCNC: 59 MG/DL
LDLC SERPL CALC-MCNC: 74 MG/DL
NONHDLC SERPL-MCNC: 104 MG/DL
TRIGL SERPL-MCNC: 152 MG/DL
TSH SERPL DL<=0.005 MIU/L-ACNC: 1.34 UIU/ML (ref 0.3–4.2)

## 2025-03-14 ENCOUNTER — HOSPITAL ENCOUNTER (OUTPATIENT)
Dept: MRI IMAGING | Facility: CLINIC | Age: 65
Discharge: HOME | End: 2025-03-14
Payer: COMMERCIAL

## 2025-03-14 DIAGNOSIS — Z87.42 HISTORY OF ATYPICAL HYPERPLASIA OF BREAST: ICD-10-CM

## 2025-03-14 DIAGNOSIS — Z80.3 FAMILY HISTORY OF MALIGNANT NEOPLASM OF BREAST: ICD-10-CM

## 2025-03-14 PROCEDURE — A9585 GADOBUTROL INJECTION: HCPCS

## 2025-03-14 PROCEDURE — 77049 MRI BREAST C-+ W/CAD BI: CPT

## 2025-03-14 PROCEDURE — 255N000002 HC RX 255 OP 636

## 2025-03-14 RX ORDER — GADOBUTROL 604.72 MG/ML
8 INJECTION INTRAVENOUS ONCE
Status: COMPLETED | OUTPATIENT
Start: 2025-03-14 | End: 2025-03-14

## 2025-03-14 RX ADMIN — GADOBUTROL 8 ML: 604.72 INJECTION INTRAVENOUS at 11:47

## 2025-03-17 ENCOUNTER — PATIENT OUTREACH (OUTPATIENT)
Dept: ONCOLOGY | Facility: CLINIC | Age: 65
End: 2025-03-17
Payer: COMMERCIAL

## 2025-03-17 NOTE — PROGRESS NOTES
New Patient Oncology Nurse Navigator Note     Referring provider:     Joseph Grijalva MD        Referring Clinic/Organization: [facility]      Referred to (specialty:) Genetic Counseling and Cancer Risk Management     Requested provider (if applicable): NA     Date Referral Received: March 17, 2025     Evaluation for:  Patient has multiple cancers in the family including melanoma same I think we should consult genetic counselor again.  Z87.42 (ICD-10-CM) - History of atypical hyperplasia of breast   Z80.8 (ICD-10-CM) - Family history of thyroid cancer     Patient was seen by Adalgisa DICKINSON CNP on 8/8/24.     Brianda reports that she is doing well at this visit. She has no concerns with her breast tissue, and no updates to her family's medical history. She has been experiencing some significant GI symptoms over the past month, and has followed with primary care. She has had lab work and imaging and will make an appointment with the GI team when she is able. She also reports having a 15 pound weight loss over the past month. I encouraged her to follow-up with the GI team, even though her symptoms are improving. She requested that I print the results of her lab work today, as she was not able to see them in [facility].      We reviewed the results of her recent breast screening, all of which has been negative. We reviewed signs and symptoms to monitor for between visits, including any breast lumps, bumps, nipple discharge, nipple inversion, changes to the texture of the skin on the breasts that would look crinkled, puckered, or like the skin of an orange peel, or any other changes to the breast tissue. We will proceed with the plan below. I will see her back in one year.         INDIVIDUALIZED CANCER RISK MANAGEMENT PLAN:           Individualized Surveillance Plan for women  With 20% or greater lifetime risk of breast cancer   Per NCCN Breast Cancer Screening and Diagnosis Guidelines Version 2.2024    Recommended screening Test or procedure Last done Next Scheduled    Clinical encounter Clinical exam every 6-12 months.   Refer to genetic counseling if not already done.  Consider risk reduction strategies.    August 2024 August 2025   However, some family histories with breast cancers at a very young age, may warrant screening starting earlier.     *May begin at age 40 if breast cancers in the family occur at later ages.     Annual mammogram beginning 10 years younger than the earliest breast cancer in the family but not prior to age 30.     Recommend annual breast MRI to begin 10 years younger than the earliest breast cancer in the family but not prior to age 25.     Breast MRIs are preferably done on day 7-15 of the menstrual cycle in premenopausal women.    See below    See below   Breast screening for patients at high risk due to thoracic radiation between the ages of 10-30    Annual clinical exam beginning 8 years after radiation therapy.     Annual screening mammogram beginning at age 30 or 8 years after radiation therapy     Annual breast MRI, beginning at age 25 or 8 years after radiation therapy.       See below    See below   Women who have a lifetime risk of >20% based on history of LCIS or ADH/ALH Annual screening mammogram beginning at age of LCIS or ADH/ALH but not prior to age 30.     Consider annual MRI to begin at age of diagnosis of LCIS or ADH/ALH but not prior to age 25.     Recommend risk reducing strategies if possible.    8/17/2023: Screening tomosynthesis mammogram, BiRads2     12/23/2023: Breast MRI, BiRads1    Mammogram scheduled for 8/26     Breast MRI in February 2025     Return to clinic in August 2025 with a mammogram following our visit     Recommend risk reducing strategies for women with 1.7% 5 year risk of breast cancer.            Payor: Zapproved / Plan: [employer] / Product Type: PPO /     March 17, 2025  Referral received and reviewed.   Sent to NPS to  schedule.     Dyana WATERMANN, RN, OCN  Oncology Nurse Navigator   [facility]  Cancer [facility] Service Line   New Patient Hem/Onc Scheduling / Referrals: [phone] (fax: [phone] )

## 2025-08-27 ENCOUNTER — LAB (OUTPATIENT)
Dept: LAB | Facility: CLINIC | Age: 65
End: 2025-08-27
Payer: COMMERCIAL

## 2025-08-27 DIAGNOSIS — E03.9 HYPOTHYROIDISM (ACQUIRED): ICD-10-CM

## 2025-08-27 LAB — TSH SERPL DL<=0.005 MIU/L-ACNC: 1.2 UIU/ML (ref 0.3–4.2)

## 2025-08-27 PROCEDURE — 36415 COLL VENOUS BLD VENIPUNCTURE: CPT

## 2025-08-27 PROCEDURE — 84443 ASSAY THYROID STIM HORMONE: CPT

## 2025-08-29 ENCOUNTER — ANCILLARY PROCEDURE (OUTPATIENT)
Dept: GENERAL RADIOLOGY | Facility: CLINIC | Age: 65
End: 2025-08-29
Attending: INTERNAL MEDICINE
Payer: COMMERCIAL

## 2025-08-29 DIAGNOSIS — G89.29 CHRONIC RIGHT SHOULDER PAIN: ICD-10-CM

## 2025-08-29 DIAGNOSIS — M25.511 CHRONIC RIGHT SHOULDER PAIN: ICD-10-CM

## 2025-08-29 PROCEDURE — 73030 X-RAY EXAM OF SHOULDER: CPT | Mod: TC | Performed by: STUDENT IN AN ORGANIZED HEALTH CARE EDUCATION/TRAINING PROGRAM

## 2025-09-01 ENCOUNTER — PATIENT OUTREACH (OUTPATIENT)
Dept: CARE COORDINATION | Facility: CLINIC | Age: 65
End: 2025-09-01
Payer: COMMERCIAL

## 2025-09-03 ENCOUNTER — PATIENT OUTREACH (OUTPATIENT)
Dept: CARE COORDINATION | Facility: CLINIC | Age: 65
End: 2025-09-03
Payer: COMMERCIAL